# Patient Record
Sex: MALE | Race: WHITE | Employment: FULL TIME | RURAL
[De-identification: names, ages, dates, MRNs, and addresses within clinical notes are randomized per-mention and may not be internally consistent; named-entity substitution may affect disease eponyms.]

---

## 2017-01-04 ENCOUNTER — OFFICE VISIT (OUTPATIENT)
Dept: PRIMARY CARE CLINIC | Age: 22
End: 2017-01-04
Payer: COMMERCIAL

## 2017-01-04 VITALS
WEIGHT: 197.4 LBS | BODY MASS INDEX: 26.16 KG/M2 | HEART RATE: 74 BPM | OXYGEN SATURATION: 97 % | RESPIRATION RATE: 20 BRPM | DIASTOLIC BLOOD PRESSURE: 72 MMHG | SYSTOLIC BLOOD PRESSURE: 110 MMHG | HEIGHT: 73 IN

## 2017-01-04 DIAGNOSIS — K29.00 ACUTE GASTRITIS WITHOUT HEMORRHAGE, UNSPECIFIED GASTRITIS TYPE: Primary | ICD-10-CM

## 2017-01-04 DIAGNOSIS — F41.9 ANXIETY: ICD-10-CM

## 2017-01-04 PROCEDURE — 99214 OFFICE O/P EST MOD 30 MIN: CPT | Performed by: NURSE PRACTITIONER

## 2017-01-04 ASSESSMENT — PATIENT HEALTH QUESTIONNAIRE - PHQ9
1. LITTLE INTEREST OR PLEASURE IN DOING THINGS: 1
SUM OF ALL RESPONSES TO PHQ9 QUESTIONS 1 & 2: 1
SUM OF ALL RESPONSES TO PHQ QUESTIONS 1-9: 1
2. FEELING DOWN, DEPRESSED OR HOPELESS: 0

## 2017-01-04 ASSESSMENT — ENCOUNTER SYMPTOMS
EYES NEGATIVE: 1
RESPIRATORY NEGATIVE: 1
GASTROINTESTINAL NEGATIVE: 1

## 2017-01-24 PROBLEM — K29.00 ACUTE GASTRITIS WITHOUT HEMORRHAGE: Status: ACTIVE | Noted: 2017-01-24

## 2017-05-05 ENCOUNTER — OFFICE VISIT (OUTPATIENT)
Dept: PRIMARY CARE CLINIC | Age: 22
End: 2017-05-05
Payer: COMMERCIAL

## 2017-05-05 VITALS
WEIGHT: 198 LBS | BODY MASS INDEX: 25.41 KG/M2 | DIASTOLIC BLOOD PRESSURE: 72 MMHG | OXYGEN SATURATION: 99 % | SYSTOLIC BLOOD PRESSURE: 124 MMHG | RESPIRATION RATE: 16 BRPM | TEMPERATURE: 98.1 F | HEIGHT: 74 IN | HEART RATE: 84 BPM

## 2017-05-05 DIAGNOSIS — M26.621 ARTHRALGIA OF RIGHT TEMPOROMANDIBULAR JOINT: Primary | ICD-10-CM

## 2017-05-05 PROCEDURE — 99212 OFFICE O/P EST SF 10 MIN: CPT | Performed by: NURSE PRACTITIONER

## 2017-05-05 RX ORDER — M-VIT,TX,IRON,MINS/CALC/FOLIC 27MG-0.4MG
1 TABLET ORAL DAILY
COMMUNITY

## 2017-05-05 ASSESSMENT — ENCOUNTER SYMPTOMS
EYES NEGATIVE: 1
RESPIRATORY NEGATIVE: 1
FACIAL SWELLING: 0
SINUS PRESSURE: 0
SORE THROAT: 0

## 2017-07-13 ENCOUNTER — OFFICE VISIT (OUTPATIENT)
Dept: PRIMARY CARE CLINIC | Age: 22
End: 2017-07-13
Payer: COMMERCIAL

## 2017-07-13 VITALS
SYSTOLIC BLOOD PRESSURE: 126 MMHG | BODY MASS INDEX: 25.36 KG/M2 | HEART RATE: 76 BPM | RESPIRATION RATE: 20 BRPM | HEIGHT: 74 IN | OXYGEN SATURATION: 97 % | WEIGHT: 197.6 LBS | DIASTOLIC BLOOD PRESSURE: 78 MMHG

## 2017-07-13 DIAGNOSIS — K62.5 RECTAL BLEEDING: Primary | ICD-10-CM

## 2017-07-13 PROCEDURE — 99213 OFFICE O/P EST LOW 20 MIN: CPT | Performed by: NURSE PRACTITIONER

## 2017-07-13 ASSESSMENT — ENCOUNTER SYMPTOMS
EYES NEGATIVE: 1
DIARRHEA: 0
ANAL BLEEDING: 1
ABDOMINAL DISTENTION: 0
NAUSEA: 0
ABDOMINAL PAIN: 0
RESPIRATORY NEGATIVE: 1
VOMITING: 0
RECTAL PAIN: 0

## 2017-07-20 ENCOUNTER — OFFICE VISIT (OUTPATIENT)
Dept: SURGERY | Facility: CLINIC | Age: 22
End: 2017-07-20

## 2017-07-20 VITALS
RESPIRATION RATE: 16 BRPM | HEART RATE: 62 BPM | SYSTOLIC BLOOD PRESSURE: 132 MMHG | DIASTOLIC BLOOD PRESSURE: 69 MMHG | BODY MASS INDEX: 26 KG/M2 | TEMPERATURE: 98.9 F | HEIGHT: 73 IN | WEIGHT: 196.2 LBS

## 2017-07-20 DIAGNOSIS — K62.5 RECTAL BLEED: Primary | ICD-10-CM

## 2017-07-20 PROCEDURE — 99203 OFFICE O/P NEW LOW 30 MIN: CPT | Performed by: SURGERY

## 2017-07-20 RX ORDER — MULTIPLE VITAMINS W/ MINERALS TAB 9MG-400MCG
1 TAB ORAL DAILY
COMMUNITY

## 2017-07-20 NOTE — PROGRESS NOTES
"Patient: Paramjit Eng    YOB: 1995    Date: 07/20/2017    Primary Care Provider: HEATHER Ramos    Reason for Consultation: rectal bleeding    Chief complaint:   Chief Complaint   Patient presents with   • Rectal Bleeding       Subjective .     History of present illness:  Patient presents today with rectal bleeding that started last week that happened a few times with small amounts of small bright red blood on the tissue after having a bowel movement. He denies any constipation ad and does not have a family history of colon cancer.     Review of Systems   Constitutional: Negative for chills, fever and unexpected weight change.   HENT: Negative for trouble swallowing and voice change.    Eyes: Negative for visual disturbance.   Respiratory: Negative for apnea, cough, chest tightness, shortness of breath and wheezing.    Cardiovascular: Negative for chest pain, palpitations and leg swelling.   Gastrointestinal: Positive for anal bleeding. Negative for abdominal distention, abdominal pain, blood in stool, constipation, diarrhea, nausea, rectal pain and vomiting.   Endocrine: Negative for cold intolerance and heat intolerance.   Genitourinary: Negative for difficulty urinating, dysuria, flank pain, scrotal swelling and testicular pain.   Musculoskeletal: Negative for back pain, gait problem and joint swelling.   Skin: Negative for color change, rash and wound.   Neurological: Negative for dizziness, syncope, speech difficulty, weakness, numbness and headaches.   Hematological: Negative for adenopathy. Does not bruise/bleed easily.   Psychiatric/Behavioral: Negative for confusion. The patient is not nervous/anxious.        Allergies:  No Known Allergies    Medications:    Current Outpatient Prescriptions:   •  Multiple Vitamins-Minerals (MULTIVITAMIN WITH MINERALS) tablet tablet, Take 1 tablet by mouth Daily., Disp: , Rfl:     History\"  History reviewed. No pertinent past medical " "history.    Past Surgical History:   Procedure Laterality Date   • SPLENECTOMY         History reviewed. No pertinent family history.    Social History   Substance Use Topics   • Smoking status: Never Smoker   • Smokeless tobacco: Never Used   • Alcohol use No        Objective     Vital Signs:   /69  Pulse 62  Temp 98.9 °F (37.2 °C) (Temporal Artery )   Resp 16  Ht 73\" (185.4 cm)  Wt 196 lb 3.2 oz (89 kg)  BMI 25.89 kg/m2    Physical Exam:   General Appearance:    Alert, cooperative, in no acute distress   Head:    Normocephalic, without obvious abnormality, atraumatic   Eyes:            Lids and lashes normal, conjunctivae and sclerae normal, no   icterus, no pallor, corneas clear, PERRLA   Ears:    Ears appear intact with no abnormalities noted   Throat:   No oral lesions, no thrush, oral mucosa moist   Neck:   No adenopathy, supple, trachea midline, no thyromegaly, no   carotid bruit, no JVD   Lungs:     Clear to auscultation,respirations regular, even and                  unlabored    Heart:    Regular rhythm and normal rate, normal S1 and S2, no            murmur, no gallop, no rub, no click   Chest Wall:    No abnormalities observed   Abdomen:     Normal bowel sounds, no masses, no organomegaly, soft        non-tender, non-distended, no guarding, no rebound                tenderness   Extremities:   Moves all extremities well, no edema, no cyanosis, no             redness   Pulses:   Pulses palpable and equal bilaterally   Skin:   No bleeding, bruising or rash   Lymph nodes:   No palpable adenopathy   Neurologic:   Cranial nerves 2 - 12 grossly intact, sensation intact, DTR       present and equal bilaterally  Rectal-no anal fissure, no internal hemorrhoids. No visible bleeding      Results Review:   I reviewed the patient's new clinical results.    Assessment/Plan     1. Rectal bleed        Likely related to internal hemorrhoids. No other symptoms to indicate a chronic problem. Due to age and " negative family history, would recommend close observation. We'll also discuss this with his mother to see if they want a colonoscopy.    I discussed the patients findings and my recommendations with patient    Electronically signed by Amy Aaron MD  07/20/17      .          Repair related initially I will 4 mm me I will

## 2017-09-15 ENCOUNTER — OFFICE VISIT (OUTPATIENT)
Dept: PRIMARY CARE CLINIC | Age: 22
End: 2017-09-15
Payer: COMMERCIAL

## 2017-09-15 VITALS
DIASTOLIC BLOOD PRESSURE: 70 MMHG | OXYGEN SATURATION: 98 % | HEART RATE: 60 BPM | RESPIRATION RATE: 20 BRPM | WEIGHT: 195.6 LBS | BODY MASS INDEX: 25.1 KG/M2 | TEMPERATURE: 98.9 F | HEIGHT: 74 IN | SYSTOLIC BLOOD PRESSURE: 120 MMHG

## 2017-09-15 DIAGNOSIS — J01.00 ACUTE NON-RECURRENT MAXILLARY SINUSITIS: Primary | ICD-10-CM

## 2017-09-15 PROCEDURE — 99213 OFFICE O/P EST LOW 20 MIN: CPT | Performed by: NURSE PRACTITIONER

## 2017-09-15 RX ORDER — AMOXICILLIN 875 MG/1
875 TABLET, COATED ORAL 2 TIMES DAILY
Qty: 20 TABLET | Refills: 0 | Status: SHIPPED | OUTPATIENT
Start: 2017-09-15 | End: 2017-09-25

## 2017-09-15 ASSESSMENT — ENCOUNTER SYMPTOMS
RESPIRATORY NEGATIVE: 1
GASTROINTESTINAL NEGATIVE: 1
RHINORRHEA: 1
EYES NEGATIVE: 1

## 2017-09-17 ENCOUNTER — APPOINTMENT (OUTPATIENT)
Dept: CT IMAGING | Facility: HOSPITAL | Age: 22
End: 2017-09-17

## 2017-09-17 ENCOUNTER — HOSPITAL ENCOUNTER (EMERGENCY)
Facility: HOSPITAL | Age: 22
Discharge: HOME | End: 2017-09-17
Attending: EMERGENCY MEDICINE | Admitting: EMERGENCY MEDICINE

## 2017-09-17 VITALS
HEIGHT: 73 IN | TEMPERATURE: 97.3 F | RESPIRATION RATE: 18 BRPM | OXYGEN SATURATION: 97 % | BODY MASS INDEX: 25.18 KG/M2 | HEART RATE: 80 BPM | DIASTOLIC BLOOD PRESSURE: 68 MMHG | WEIGHT: 190 LBS | SYSTOLIC BLOOD PRESSURE: 120 MMHG

## 2017-09-17 DIAGNOSIS — R20.2 PARESTHESIA: Primary | ICD-10-CM

## 2017-09-17 LAB
ALBUMIN SERPL-MCNC: 4.8 G/DL (ref 3.5–5)
ALBUMIN/GLOB SERPL: 1.5 G/DL (ref 1–2)
ALP SERPL-CCNC: 44 U/L (ref 38–126)
ALT SERPL W P-5'-P-CCNC: 35 U/L (ref 13–69)
ANION GAP SERPL CALCULATED.3IONS-SCNC: 15.6 MMOL/L
AST SERPL-CCNC: 21 U/L (ref 15–46)
BASOPHILS # BLD AUTO: 0.07 10*3/MM3 (ref 0–0.2)
BASOPHILS NFR BLD AUTO: 0.9 % (ref 0–2.5)
BILIRUB SERPL-MCNC: 0.5 MG/DL (ref 0.2–1.3)
BUN BLD-MCNC: 21 MG/DL (ref 7–20)
BUN/CREAT SERPL: 26.3 (ref 6.3–21.9)
CALCIUM SPEC-SCNC: 9.6 MG/DL (ref 8.4–10.2)
CHLORIDE SERPL-SCNC: 104 MMOL/L (ref 98–107)
CO2 SERPL-SCNC: 25 MMOL/L (ref 26–30)
CREAT BLD-MCNC: 0.8 MG/DL (ref 0.6–1.3)
DEPRECATED RDW RBC AUTO: 44.1 FL (ref 37–54)
EOSINOPHIL # BLD AUTO: 0.18 10*3/MM3 (ref 0–0.7)
EOSINOPHIL NFR BLD AUTO: 2.4 % (ref 0–7)
ERYTHROCYTE [DISTWIDTH] IN BLOOD BY AUTOMATED COUNT: 12.9 % (ref 11.5–14.5)
GFR SERPL CREATININE-BSD FRML MDRD: 123 ML/MIN/1.73
GLOBULIN UR ELPH-MCNC: 3.1 GM/DL
GLUCOSE BLD-MCNC: 113 MG/DL (ref 74–98)
GLUCOSE BLDC GLUCOMTR-MCNC: 115 MG/DL (ref 70–130)
HCT VFR BLD AUTO: 43.9 % (ref 42–52)
HGB BLD-MCNC: 14.5 G/DL (ref 14–18)
HOLD SPECIMEN: NORMAL
HOLD SPECIMEN: NORMAL
IMM GRANULOCYTES # BLD: 0.01 10*3/MM3 (ref 0–0.06)
IMM GRANULOCYTES NFR BLD: 0.1 % (ref 0–0.6)
LYMPHOCYTES # BLD AUTO: 2.64 10*3/MM3 (ref 0.6–3.4)
LYMPHOCYTES NFR BLD AUTO: 35.3 % (ref 10–50)
MCH RBC QN AUTO: 30.8 PG (ref 27–31)
MCHC RBC AUTO-ENTMCNC: 33 G/DL (ref 30–37)
MCV RBC AUTO: 93.2 FL (ref 80–94)
MONOCYTES # BLD AUTO: 0.95 10*3/MM3 (ref 0–0.9)
MONOCYTES NFR BLD AUTO: 12.7 % (ref 0–12)
NEUTROPHILS # BLD AUTO: 3.63 10*3/MM3 (ref 2–6.9)
NEUTROPHILS NFR BLD AUTO: 48.6 % (ref 37–80)
NRBC BLD MANUAL-RTO: 0 /100 WBC (ref 0–0)
PLATELET # BLD AUTO: 269 10*3/MM3 (ref 130–400)
PMV BLD AUTO: 11.6 FL (ref 6–12)
POTASSIUM BLD-SCNC: 3.6 MMOL/L (ref 3.5–5.1)
PROT SERPL-MCNC: 7.9 G/DL (ref 6.3–8.2)
RBC # BLD AUTO: 4.71 10*6/MM3 (ref 4.7–6.1)
SODIUM BLD-SCNC: 141 MMOL/L (ref 137–145)
TROPONIN I SERPL-MCNC: <0.012 NG/ML (ref 0–0.03)
WBC NRBC COR # BLD: 7.48 10*3/MM3 (ref 4.8–10.8)
WHOLE BLOOD HOLD SPECIMEN: NORMAL
WHOLE BLOOD HOLD SPECIMEN: NORMAL

## 2017-09-17 PROCEDURE — 93005 ELECTROCARDIOGRAM TRACING: CPT | Performed by: EMERGENCY MEDICINE

## 2017-09-17 PROCEDURE — 85025 COMPLETE CBC W/AUTO DIFF WBC: CPT | Performed by: PHYSICIAN ASSISTANT

## 2017-09-17 PROCEDURE — 80053 COMPREHEN METABOLIC PANEL: CPT | Performed by: PHYSICIAN ASSISTANT

## 2017-09-17 PROCEDURE — 82962 GLUCOSE BLOOD TEST: CPT

## 2017-09-17 PROCEDURE — 84484 ASSAY OF TROPONIN QUANT: CPT | Performed by: PHYSICIAN ASSISTANT

## 2017-09-17 PROCEDURE — 99284 EMERGENCY DEPT VISIT MOD MDM: CPT

## 2017-09-17 PROCEDURE — 70450 CT HEAD/BRAIN W/O DYE: CPT

## 2017-09-17 RX ORDER — SODIUM CHLORIDE 0.9 % (FLUSH) 0.9 %
10 SYRINGE (ML) INJECTION AS NEEDED
Status: DISCONTINUED | OUTPATIENT
Start: 2017-09-17 | End: 2017-09-17 | Stop reason: HOSPADM

## 2017-09-17 NOTE — ED PROVIDER NOTES
Subjective   HPI Comments: Old male presents to emergency department with a numbness sensation on the left side of his face and left arm.  He also had left upper chest pain that lasted briefly.  He's had the symptoms intermittently for 2 days.  He states that they became worse while at work today.  He states that his feels like it's tingling but he can feel sensation to touch.  No loss of vision no trouble speaking no loss of movement on either side of his body.  He also states that he had a sharp chest pain that lasted briefly.      History provided by:  Patient   used: No        Review of Systems   Neurological:        Numbness   All other systems reviewed and are negative.      History reviewed. No pertinent past medical history.    No Known Allergies    Past Surgical History:   Procedure Laterality Date   • SPLENECTOMY         History reviewed. No pertinent family history.    Social History     Social History   • Marital status: Single     Spouse name: N/A   • Number of children: N/A   • Years of education: N/A     Social History Main Topics   • Smoking status: Never Smoker   • Smokeless tobacco: Never Used   • Alcohol use No   • Drug use: No   • Sexual activity: Not Asked     Other Topics Concern   • None     Social History Narrative           Objective   Physical Exam   Constitutional: He is oriented to person, place, and time. He appears well-developed and well-nourished.   HENT:   Head: Normocephalic and atraumatic.   Left Ear: External ear normal.   Eyes: EOM are normal. Pupils are equal, round, and reactive to light.   Neck: Normal range of motion.   Cardiovascular: Normal rate and regular rhythm.    Pulmonary/Chest: Effort normal and breath sounds normal.   Abdominal: Soft. Bowel sounds are normal.   Musculoskeletal: Normal range of motion.   Neurological: He is alert and oriented to person, place, and time.   Skin: Skin is warm and dry.   Psychiatric: He has a normal mood and affect.  His behavior is normal. Judgment and thought content normal.   Nursing note and vitals reviewed.      Procedures         ED Course  ED Course                  MDM    Final diagnoses:   Paresthesia            Obinna High Jr., PA-C  09/17/17 1935

## 2017-09-18 ENCOUNTER — TELEPHONE (OUTPATIENT)
Dept: PRIMARY CARE CLINIC | Age: 22
End: 2017-09-18

## 2019-12-10 RX ORDER — ONDANSETRON 4 MG/1
4 TABLET, ORALLY DISINTEGRATING ORAL EVERY 6 HOURS PRN
Qty: 40 TABLET | Refills: 0 | OUTPATIENT
Start: 2019-12-10 | End: 2020-11-16

## 2020-11-16 PROCEDURE — U0004 COV-19 TEST NON-CDC HGH THRU: HCPCS | Performed by: NURSE PRACTITIONER

## 2020-12-04 ENCOUNTER — HOSPITAL ENCOUNTER (EMERGENCY)
Facility: HOSPITAL | Age: 25
Discharge: HOME | End: 2020-12-04
Attending: HOSPITALIST
Payer: COMMERCIAL

## 2020-12-04 VITALS
RESPIRATION RATE: 18 BRPM | SYSTOLIC BLOOD PRESSURE: 131 MMHG | BODY MASS INDEX: 27.59 KG/M2 | OXYGEN SATURATION: 97 % | HEIGHT: 74 IN | TEMPERATURE: 98.2 F | DIASTOLIC BLOOD PRESSURE: 76 MMHG | WEIGHT: 215 LBS | HEART RATE: 87 BPM

## 2020-12-04 PROCEDURE — 99283 EMERGENCY DEPT VISIT LOW MDM: CPT

## 2020-12-04 ASSESSMENT — PAIN DESCRIPTION - DESCRIPTORS: DESCRIPTORS: ACHING;SHARP

## 2020-12-04 ASSESSMENT — PAIN DESCRIPTION - ORIENTATION: ORIENTATION: LEFT

## 2020-12-04 ASSESSMENT — PAIN SCALES - GENERAL: PAINLEVEL_OUTOF10: 5

## 2020-12-04 ASSESSMENT — PAIN DESCRIPTION - LOCATION: LOCATION: LEG

## 2020-12-04 ASSESSMENT — PAIN DESCRIPTION - FREQUENCY: FREQUENCY: CONTINUOUS

## 2020-12-04 ASSESSMENT — PAIN DESCRIPTION - PAIN TYPE: TYPE: ACUTE PAIN

## 2020-12-04 NOTE — ED PROVIDER NOTES
[address] [facility] ENCOUNTER      Pt Name: Yvan Rasmussen  MRN: [id number]  YOB: 1995  Date of evaluation: 12/4/2020  Provider: Lisa Navarro, [address] [place] [address]       Chief Complaint   Patient presents with    Leg Pain         HISTORY OF PRESENT ILLNESS  (Location/Symptom, Timing/Onset, Context/Setting, Quality, Duration, Modifying Factors, Severity.)   Yvan Rasmussen is a 26 y.o. male who presents to the emergency department for left leg injury. 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 Patient states he was at a customer's house and states that the individual just put up a new set of stairs and he had to put his weight down on the first step and the entire thing collapsed. Patient states he did have a fall but he sort of caught himself and scraped his left leg against some of the wood. Patient denies any other pain or injury. Patient states he would need to be here for evaluation but his workplace wanted to come to have it evaluated make sure there was not anything broken or needed any further evaluation. Patient states he is been able to ambulate without any difficulty since the time. His only complaint is some pain and swelling to the shin of his left lower leg. Denies any numbness tingling or weakness out of ordinary. Denies any chest pain or shortness of breath. Denies any difficulty with ambulating. Denies any pain in any other extremity. Denies any head injury or loss of consciousness secondary to the incident. Nursing notes were reviewed.     REVIEW OFSYSTEMS    (2-9 systems for level 4, 10 or more for level 5)   ROS:  General:  No fevers, no chills, no weakness  Cardiovascular:  No chest pain, no palpitations  Respiratory:  No shortness of breath, no cough, no wheezing  Gastrointestinal:  No pain, no nausea, no vomiting, no diarrhea  Musculoskeletal:  +left shin pain/abrasion/swelling, no joint pain  Skin:  No rash, no partner: None     Emotionally abused: None     Physically abused: None     Forced sexual activity: None   Other Topics Concern    None   Social History Narrative    None         PHYSICAL EXAM    (up to 7 for level 4, 8 or more for level 5)     ED Triage Vitals [12/04/20 1033]   BP Temp Temp Source Pulse Resp SpO2 Height Weight   -- 98.2 °F (36.8 °C) Oral -- -- -- 6' 2\" (1.88 m) 215 lb (97.5 kg)       Physical Exam  General :Patient is awake, alert, oriented, in no acute distress, nontoxic appearing  HEENT: Pupils are equally round and reactive to light, EOMI, conjunctivae clear. Oral mucosa is moist, no exudate. Uvula is midline  Cardiac: Heart regular rate, rhythm, no murmurs, rubs, or gallops  Lungs: Lungs are clear to auscultation, there is no wheezing, rhonchi, or rales. There is no use of accessory muscles. Abdomen: Abdomen is soft, nontender, nondistended. There is no firm or pulsatile masses, no rebound rigidity or guarding. Musculoskeletal: 5 out of 5 strength in all 4 extremities. No focal muscle deficits are appreciated, patient has an abrasion noted to the left mid shin which measures in his longest diameter 6 cm in length and 3 cm in width however the entire portion of that area is not that long or wide over the majority. There is some underlying swelling and a formation of a hematoma to the lower aspect of that wound. Patient tolerates palpation of the area without any difficulty. There is no active bleeding noted. Patient is able to bear weight on the extremity for any difficulty he is able to flex and extend the foot without any difficulty and also against resistance. Neuro: Motor intact, sensory intact, level of consciousness is normal  Dermatology: Skin is warm and dry  Psych: Mentation is grossly normal, cognition is grossly normal. Affect is appropriate.       DIAGNOSTIC RESULTS     EKG: All EKG's are interpreted by the Emergency Department Physician who either signs or Co-signs this chart in the [address] a cardiologist.        RADIOLOGY:   Non-plain film images such as CT, Ultrasound and MRI are read by the radiologist. Plain radiographic images are visualized and preliminarily interpreted by the emergency physician with the below findings:      ? Radiologist's Report Reviewed:  No orders to display         ED BEDSIDE ULTRASOUND:   Performed by ED Physician - none    LABS:    I have reviewed and interpreted all of the currently available lab results from this visit (ifapplicable):  No results found for this visit on 12/04/20. All other labs were within normal range or not returned as of this dictation. EMERGENCY DEPARTMENT COURSE and DIFFERENTIAL DIAGNOSIS/MDM:   Vitals:    Vitals:    12/04/20 1033 12/04/20 1039   BP:  (!) 122/91   Pulse:  94   Resp:  18   Temp: 98.2 °F (36.8 °C)    TempSrc: Oral    SpO2:  98%   Weight: 215 lb (97.5 kg)    Height: 6' 2\" (1.88 m)        MEDICATIONS ADMINISTERED IN ED:  Medications - No data to display    After initial evaluation and examination I did have a conversation with the patient about the upcoming plan, treatment possible disposition which they were agreeable to the times dictation. Patient advised that he could use an ice pack over the next 24 hours to help with pain and swelling. Advised use 20 minutes on 20 minutes off and not to put the ice directly against the skin to make sure there is Esaw Magallanes some type of thin barrier. He does state his understanding of this. Patient's been able to ambulate on the extremity without any difficulty the area and distribution of the abrasion and bruising really is not consistent with the need of a radiograph to rule out fracture. He has no tenderness at the tibial plateau area and again he is able to bear weight without any difficulty.   Patient advised that this abrasion will have a hematoma under it which will turn many different colors black to green to yellow in prior take several weeks if not a month or longer to go completely away and he does state his understanding of this advised that the ice pack can help with some of the swelling. Patient was given instructions that we will bandage and dressed the area after cleaning the wound here to leave the dressing on for the first 24 hours then after that he can leave it open to air afterwards. He does not need to use any antibiotic ointment or a peroxide to the area just clean it with soap and water and should heal without any difficulty. However he was advised that if he gets to an area and he is afraid that the abrasion may become contaminated he could cover or use a dressing at that time. Patient be discharged home in stable condition advised that he could return back to work and is agreeable. Patient be discharged home in stable condition. The patient advised that he does need to follow-up with his regular family physician within the next 1 to 2 days reevaluation. Patient was also given instructions if symptoms worsens or new symptoms arise he should return back to emergency department for further evaluation work-up. Advised use over-the-counter Tylenol or Motrin for pain. CONSULTS:  None    PROCEDURES:  Procedures    CRITICAL CARE TIME    Total Critical Care time was 0 minutes, excluding separately reportable procedures. There was a high probability of clinically significant/life threatening deterioration in the patient's condition which required my urgent intervention. FINAL IMPRESSION      1. Abrasion of left lower leg, initial encounter    2. Traumatic hematoma of left lower leg, initial encounter          DISPOSITION/PLAN   DISPOSITION        PATIENT REFERRED TO:  Jamal Degroot, APRN  [id number]  [phone]    In 2 days      [facility]  [facility] [address]   [facility]  [phone]    As needed, If symptoms worsen      DISCHARGE MEDICATIONS:  New Prescriptions    No medications on file       Comment: Please note this report has been produced using speech recognition software and may contain errorsrelated to that system including errors in grammar, punctuation, and spelling, as well as words and phrases that may be inappropriate. If there are any questions or concerns please feel free to contact the dictating providerfor clarification.     Sindy Wood DO  Attending Emergency Physician              Sindy Wood DO  12/04/20 1125

## 2020-12-04 NOTE — ED NOTES
RN went over d/c instructions with pt. Advised can follow up with PCP in a couple days. May return to ER if condition changes or worsens. No further questions or concerns. Pt ambulated out with no issues.       Bianka Singh RN  12/04/20 9690

## 2020-12-04 NOTE — ED TRIAGE NOTES
Pt came to er today with leg pain from falling down a stairwell that gave out at work about 30 minutes ago. Pt rates pain 5/10. States pain is aching and stabbing and stinging. 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

## 2021-08-16 ENCOUNTER — OFFICE VISIT (OUTPATIENT)
Dept: SURGERY | Facility: CLINIC | Age: 26
End: 2021-08-16

## 2021-08-16 VITALS
TEMPERATURE: 97.1 F | WEIGHT: 241.6 LBS | HEIGHT: 74 IN | HEART RATE: 85 BPM | BODY MASS INDEX: 31.01 KG/M2 | OXYGEN SATURATION: 100 % | DIASTOLIC BLOOD PRESSURE: 84 MMHG | SYSTOLIC BLOOD PRESSURE: 126 MMHG

## 2021-08-16 DIAGNOSIS — L30.9 DERMATITIS: Primary | ICD-10-CM

## 2021-08-16 PROCEDURE — 99203 OFFICE O/P NEW LOW 30 MIN: CPT | Performed by: SURGERY

## 2021-08-16 NOTE — PROGRESS NOTES
"Patient: Paramjit Eng    YOB: 1995    Date: 08/16/2021    Primary Care Provider: Le Olson APRN    Chief Complaint   Patient presents with   • Skin Lesion     left side of neck       SUBJECTIVE:    History of present illness:  I saw the patient in the office today for evaluation and consultation of questionable lesion on neck. Patient states he noticed a spot on on left side of neck 3 days ago that is red and is warm to the touch.  Area is tender and sore.  No fluctuance or drainage.  No known bite or exposure to poison ivy.    The following portions of the patient's history were reviewed and updated as appropriate: allergies, current medications, past family history, past medical history, past social history, past surgical history and problem list.      Review of Systems    Allergies:  No Known Allergies    Medications:    Current Outpatient Medications:   •  Multiple Vitamins-Minerals (MULTIVITAMIN WITH MINERALS) tablet tablet, Take 1 tablet by mouth Daily., Disp: , Rfl:   •  methylPREDNISolone (MEDROL) 4 MG dose pack, Take as directed on package instructions., Disp: 1 each, Rfl: 0    History:  History reviewed. No pertinent past medical history.    Past Surgical History:   Procedure Laterality Date   • SPLENECTOMY         History reviewed. No pertinent family history.    Social History     Tobacco Use   • Smoking status: Never Smoker   • Smokeless tobacco: Never Used   Substance Use Topics   • Alcohol use: No   • Drug use: No        OBJECTIVE:    Vital Signs:   Vitals:    08/16/21 1520   BP: 126/84   Pulse: 85   Temp: 97.1 °F (36.2 °C)   SpO2: 100%   Weight: 110 kg (241 lb 9.6 oz)   Height: 188 cm (74\")       Physical Exam:   General Appearance:    Alert, cooperative, in no acute distress   Head:    Normocephalic, without obvious abnormality, atraumatic   Eyes:            Lids and lashes normal, conjunctivae and sclerae normal, no   icterus, no pallor, corneas clear, PERRLA   Ears:    " Ears appear intact with no abnormalities noted   Throat:   No oral lesions, no thrush, oral mucosa moist   Neck:   No adenopathy, supple, trachea midline, no thyromegaly, no   carotid bruit, no JVD   Lungs:     Clear to auscultation,respirations regular, even and                  unlabored    Heart:    Regular rhythm and normal rate, normal S1 and S2, no            murmur, no gallop, no rub, no click   Chest Wall:    No abnormalities observed   Abdomen:     Normal bowel sounds, no masses, no organomegaly, soft        non-tender, non-distended, no guarding, no rebound                tenderness   Extremities:   Moves all extremities well, no edema, no cyanosis, no             redness   Pulses:   Pulses palpable and equal bilaterally   Skin:   No bleeding, bruising but patient has an area measures 2 x 3 cm on the left neck, raised areas with dermatitis.  No fluctuance or cellulitis.   Lymph nodes:   No palpable adenopathy   Neurologic:   Cranial nerves 2 - 12 grossly intact, sensation intact, DTR       present and equal bilaterally     Results Review:   I reviewed the patient's new clinical results.    Review of Systems was reviewed and confirmed as accurate as documented by the MA.    ASSESSMENT/PLAN:    1. Dermatitis        Patient reassured, placed on hydrocortisone cream twice a day and follow-up in 7 days if no improvement.  No evidence of abscess or cellulitis at this time.    I discussed the patients findings and my recommendations with patient        Electronically signed by Amy Aaron MD  08/16/21

## 2022-05-11 ENCOUNTER — HOSPITAL ENCOUNTER (EMERGENCY)
Facility: HOSPITAL | Age: 27
Discharge: HOME | End: 2022-05-11
Attending: EMERGENCY MEDICINE | Admitting: EMERGENCY MEDICINE

## 2022-05-11 ENCOUNTER — APPOINTMENT (OUTPATIENT)
Dept: GENERAL RADIOLOGY | Facility: HOSPITAL | Age: 27
End: 2022-05-11

## 2022-05-11 VITALS
DIASTOLIC BLOOD PRESSURE: 88 MMHG | SYSTOLIC BLOOD PRESSURE: 143 MMHG | OXYGEN SATURATION: 100 % | BODY MASS INDEX: 31.18 KG/M2 | HEIGHT: 74 IN | HEART RATE: 69 BPM | TEMPERATURE: 97.9 F | WEIGHT: 243 LBS | RESPIRATION RATE: 20 BRPM

## 2022-05-11 DIAGNOSIS — R42 DIZZINESS OF UNKNOWN CAUSE: Primary | ICD-10-CM

## 2022-05-11 LAB
ALBUMIN SERPL-MCNC: 4.4 G/DL (ref 3.5–5.2)
ALBUMIN/GLOB SERPL: 1.6 G/DL
ALP SERPL-CCNC: 49 U/L (ref 39–117)
ALT SERPL W P-5'-P-CCNC: 13 U/L (ref 1–41)
ANION GAP SERPL CALCULATED.3IONS-SCNC: 10 MMOL/L (ref 5–15)
AST SERPL-CCNC: 14 U/L (ref 1–40)
B PARAPERT DNA SPEC QL NAA+PROBE: NOT DETECTED
B PERT DNA SPEC QL NAA+PROBE: NOT DETECTED
BASOPHILS # BLD AUTO: 0.06 10*3/MM3 (ref 0–0.2)
BASOPHILS NFR BLD AUTO: 1.1 % (ref 0–1.5)
BILIRUB SERPL-MCNC: 0.2 MG/DL (ref 0–1.2)
BILIRUB UR QL STRIP: NEGATIVE
BUN SERPL-MCNC: 17 MG/DL (ref 6–20)
BUN/CREAT SERPL: 20 (ref 7–25)
C PNEUM DNA NPH QL NAA+NON-PROBE: NOT DETECTED
CALCIUM SPEC-SCNC: 9.4 MG/DL (ref 8.6–10.5)
CHLORIDE SERPL-SCNC: 103 MMOL/L (ref 98–107)
CLARITY UR: CLEAR
CO2 SERPL-SCNC: 26 MMOL/L (ref 22–29)
COLOR UR: YELLOW
CREAT SERPL-MCNC: 0.85 MG/DL (ref 0.76–1.27)
DEPRECATED RDW RBC AUTO: 43.8 FL (ref 37–54)
EGFRCR SERPLBLD CKD-EPI 2021: 123.7 ML/MIN/1.73
EOSINOPHIL # BLD AUTO: 0.1 10*3/MM3 (ref 0–0.4)
EOSINOPHIL NFR BLD AUTO: 1.8 % (ref 0.3–6.2)
ERYTHROCYTE [DISTWIDTH] IN BLOOD BY AUTOMATED COUNT: 12.9 % (ref 12.3–15.4)
FLUAV SUBTYP SPEC NAA+PROBE: NOT DETECTED
FLUBV RNA ISLT QL NAA+PROBE: NOT DETECTED
GLOBULIN UR ELPH-MCNC: 2.7 GM/DL
GLUCOSE SERPL-MCNC: 115 MG/DL (ref 65–99)
GLUCOSE UR STRIP-MCNC: NEGATIVE MG/DL
HADV DNA SPEC NAA+PROBE: NOT DETECTED
HCOV 229E RNA SPEC QL NAA+PROBE: NOT DETECTED
HCOV HKU1 RNA SPEC QL NAA+PROBE: NOT DETECTED
HCOV NL63 RNA SPEC QL NAA+PROBE: NOT DETECTED
HCOV OC43 RNA SPEC QL NAA+PROBE: NOT DETECTED
HCT VFR BLD AUTO: 43 % (ref 37.5–51)
HGB BLD-MCNC: 14.2 G/DL (ref 13–17.7)
HGB UR QL STRIP.AUTO: NEGATIVE
HMPV RNA NPH QL NAA+NON-PROBE: NOT DETECTED
HPIV1 RNA ISLT QL NAA+PROBE: NOT DETECTED
HPIV2 RNA SPEC QL NAA+PROBE: NOT DETECTED
HPIV3 RNA NPH QL NAA+PROBE: NOT DETECTED
HPIV4 P GENE NPH QL NAA+PROBE: NOT DETECTED
IMM GRANULOCYTES # BLD AUTO: 0.01 10*3/MM3 (ref 0–0.05)
IMM GRANULOCYTES NFR BLD AUTO: 0.2 % (ref 0–0.5)
KETONES UR QL STRIP: NEGATIVE
LEUKOCYTE ESTERASE UR QL STRIP.AUTO: NEGATIVE
LIPASE SERPL-CCNC: 31 U/L (ref 13–60)
LYMPHOCYTES # BLD AUTO: 2.29 10*3/MM3 (ref 0.7–3.1)
LYMPHOCYTES NFR BLD AUTO: 41.3 % (ref 19.6–45.3)
M PNEUMO IGG SER IA-ACNC: NOT DETECTED
MAGNESIUM SERPL-MCNC: 2 MG/DL (ref 1.6–2.6)
MCH RBC QN AUTO: 30.6 PG (ref 26.6–33)
MCHC RBC AUTO-ENTMCNC: 33 G/DL (ref 31.5–35.7)
MCV RBC AUTO: 92.7 FL (ref 79–97)
MONOCYTES # BLD AUTO: 0.83 10*3/MM3 (ref 0.1–0.9)
MONOCYTES NFR BLD AUTO: 15 % (ref 5–12)
NEUTROPHILS NFR BLD AUTO: 2.26 10*3/MM3 (ref 1.7–7)
NEUTROPHILS NFR BLD AUTO: 40.6 % (ref 42.7–76)
NITRITE UR QL STRIP: NEGATIVE
NRBC BLD AUTO-RTO: 0 /100 WBC (ref 0–0.2)
PH UR STRIP.AUTO: 7 [PH] (ref 5–8)
PLATELET # BLD AUTO: 304 10*3/MM3 (ref 140–450)
PMV BLD AUTO: 10.8 FL (ref 6–12)
POTASSIUM SERPL-SCNC: 4 MMOL/L (ref 3.5–5.2)
PROT SERPL-MCNC: 7.1 G/DL (ref 6–8.5)
PROT UR QL STRIP: NEGATIVE
RBC # BLD AUTO: 4.64 10*6/MM3 (ref 4.14–5.8)
RHINOVIRUS RNA SPEC NAA+PROBE: NOT DETECTED
RSV RNA NPH QL NAA+NON-PROBE: NOT DETECTED
SARS-COV-2 RNA NPH QL NAA+NON-PROBE: NOT DETECTED
SODIUM SERPL-SCNC: 139 MMOL/L (ref 136–145)
SP GR UR STRIP: 1.01 (ref 1–1.03)
TROPONIN T SERPL-MCNC: <0.01 NG/ML (ref 0–0.03)
UROBILINOGEN UR QL STRIP: NORMAL
WBC NRBC COR # BLD: 5.55 10*3/MM3 (ref 3.4–10.8)

## 2022-05-11 PROCEDURE — 93005 ELECTROCARDIOGRAM TRACING: CPT | Performed by: NURSE PRACTITIONER

## 2022-05-11 PROCEDURE — 83690 ASSAY OF LIPASE: CPT | Performed by: NURSE PRACTITIONER

## 2022-05-11 PROCEDURE — 85025 COMPLETE CBC W/AUTO DIFF WBC: CPT | Performed by: NURSE PRACTITIONER

## 2022-05-11 PROCEDURE — 80053 COMPREHEN METABOLIC PANEL: CPT | Performed by: NURSE PRACTITIONER

## 2022-05-11 PROCEDURE — 81003 URINALYSIS AUTO W/O SCOPE: CPT | Performed by: NURSE PRACTITIONER

## 2022-05-11 PROCEDURE — 0202U NFCT DS 22 TRGT SARS-COV-2: CPT | Performed by: NURSE PRACTITIONER

## 2022-05-11 PROCEDURE — 99283 EMERGENCY DEPT VISIT LOW MDM: CPT

## 2022-05-11 PROCEDURE — 84484 ASSAY OF TROPONIN QUANT: CPT | Performed by: NURSE PRACTITIONER

## 2022-05-11 PROCEDURE — 71045 X-RAY EXAM CHEST 1 VIEW: CPT

## 2022-05-11 PROCEDURE — 83735 ASSAY OF MAGNESIUM: CPT | Performed by: NURSE PRACTITIONER

## 2022-05-11 RX ORDER — SODIUM CHLORIDE 0.9 % (FLUSH) 0.9 %
10 SYRINGE (ML) INJECTION AS NEEDED
Status: DISCONTINUED | OUTPATIENT
Start: 2022-05-11 | End: 2022-05-11 | Stop reason: HOSPADM

## 2022-05-11 RX ADMIN — SODIUM CHLORIDE 1000 ML: 9 INJECTION, SOLUTION INTRAVENOUS at 11:16

## 2022-05-11 NOTE — ED PROVIDER NOTES
Subjective   26-year-old male presents to the ED today for complaint of feeling dizzy and feeling like he is foggy this morning.  He did eat and drink this morning.  Says that he has still felt bad.  No nausea or vomiting.  He does have a small headache above his right eye that is causing some dizziness.  He has not been sick recently with fever, chills, nausea or vomiting.  Mom is with him and states that he had an accident a long time ago when he was a teenager that he had a liver lac and had a cardiac contusion.  He has had some of these episodes since then.  He was told he would grow out of this problem.  He denies any chest pain or shortness of breath.  No other associated signs or symptoms today          Review of Systems   Constitutional: Positive for fatigue.   HENT: Negative.    Eyes: Negative.    Respiratory: Negative.    Cardiovascular: Negative.    Gastrointestinal: Negative.    Genitourinary: Negative.    Musculoskeletal: Negative.    Skin: Negative.    Allergic/Immunologic: Negative.    Neurological: Positive for dizziness and weakness.   Hematological: Negative.    Psychiatric/Behavioral: Negative.        History reviewed. No pertinent past medical history.    No Known Allergies    Past Surgical History:   Procedure Laterality Date   • SPLENECTOMY         History reviewed. No pertinent family history.    Social History     Socioeconomic History   • Marital status: Single   Tobacco Use   • Smoking status: Never Smoker   • Smokeless tobacco: Never Used   Substance and Sexual Activity   • Alcohol use: No   • Drug use: No   • Sexual activity: Defer           Objective   Physical Exam  Vitals and nursing note reviewed. Exam conducted with a chaperone present.   Constitutional:       Appearance: Normal appearance.   HENT:      Head: Normocephalic and atraumatic.      Right Ear: External ear normal.      Left Ear: External ear normal.      Nose: Nose normal.      Mouth/Throat:      Mouth: Mucous membranes  are moist.      Pharynx: Oropharynx is clear.   Eyes:      Extraocular Movements: Extraocular movements intact.      Conjunctiva/sclera: Conjunctivae normal.      Pupils: Pupils are equal, round, and reactive to light.   Cardiovascular:      Rate and Rhythm: Normal rate and regular rhythm.      Pulses: Normal pulses.      Heart sounds: Normal heart sounds.   Pulmonary:      Effort: Pulmonary effort is normal.      Breath sounds: Normal breath sounds.   Abdominal:      General: Bowel sounds are normal.      Palpations: Abdomen is soft.   Musculoskeletal:         General: Normal range of motion.      Cervical back: Normal range of motion.   Skin:     General: Skin is warm and dry.      Capillary Refill: Capillary refill takes less than 2 seconds.   Neurological:      Mental Status: He is alert and oriented to person, place, and time.   Psychiatric:         Behavior: Behavior normal.         Procedures           ED Course  ED Course as of 05/11/22 1241   Wed May 11, 2022   1136 EKG interpreted by me.  Sinus rhythm.  Rate of 83.  Sinus arrhythmia.  Nonspecific T wave changes.  Abnormal EKG [CG]   1239 Discussed results with patient and mother.  Everything was normal limits and unalarming.  Patient to follow-up with PCP [JK]      ED Course User Index  [CG] [place],   [JK] Amy Watson, HEATHER                                                 [facility]    Final diagnoses:   Dizziness of unknown cause       ED Disposition  ED Disposition     ED Disposition   Discharge    Condition   Stable    Comment   --             Le Olson APRN  [address] [facility] [address]  [phone]    Schedule an appointment as soon as possible for a visit   As needed, If symptoms worsen         Medication List      No changes were made to your prescriptions during this visit.          Amy Watson APRN  05/11/22 1241

## 2022-05-16 ENCOUNTER — OFFICE VISIT (OUTPATIENT)
Dept: PRIMARY CARE CLINIC | Age: 27
End: 2022-05-16
Payer: COMMERCIAL

## 2022-05-16 VITALS
WEIGHT: 244 LBS | HEART RATE: 85 BPM | HEIGHT: 74 IN | OXYGEN SATURATION: 99 % | BODY MASS INDEX: 31.32 KG/M2 | DIASTOLIC BLOOD PRESSURE: 86 MMHG | SYSTOLIC BLOOD PRESSURE: 124 MMHG

## 2022-05-16 DIAGNOSIS — R51.9 NONINTRACTABLE HEADACHE, UNSPECIFIED CHRONICITY PATTERN, UNSPECIFIED HEADACHE TYPE: ICD-10-CM

## 2022-05-16 DIAGNOSIS — Z09 HOSPITAL DISCHARGE FOLLOW-UP: Primary | ICD-10-CM

## 2022-05-16 DIAGNOSIS — R42 DIZZINESS: ICD-10-CM

## 2022-05-16 PROCEDURE — 99213 OFFICE O/P EST LOW 20 MIN: CPT | Performed by: PHYSICIAN ASSISTANT

## 2022-05-16 PROCEDURE — 1111F DSCHRG MED/CURRENT MED MERGE: CPT | Performed by: PHYSICIAN ASSISTANT

## 2022-05-16 RX ORDER — METHYLPREDNISOLONE 4 MG/1
TABLET ORAL
COMMUNITY
Start: 2020-11-16 | End: 2022-05-16

## 2022-05-16 ASSESSMENT — ENCOUNTER SYMPTOMS
GASTROINTESTINAL NEGATIVE: 1
RESPIRATORY NEGATIVE: 1

## 2022-05-16 NOTE — PROGRESS NOTES
Chief Complaint   Patient presents with    Follow-Up from Hospital    Dizziness     Pt here today for ED f/u dx Dizziness of unknown cause.  Still having episodes of dizziness, fatigue, \"fogginess\", lips tingling, intermittently since last Wednesday, worsens with movement     Have you seen another provider for this condition recently- Yes, [facility]     Have you had any recent diagnostic testing for this condition- Yes, UA, labs, respiratory panel     Do you currently take any medications for this condition- Yes
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
## 2022-05-18 ENCOUNTER — HOSPITAL ENCOUNTER (EMERGENCY)
Facility: HOSPITAL | Age: 27
Discharge: HOME | End: 2022-05-18
Attending: EMERGENCY MEDICINE | Admitting: EMERGENCY MEDICINE

## 2022-05-18 ENCOUNTER — APPOINTMENT (OUTPATIENT)
Dept: GENERAL RADIOLOGY | Facility: HOSPITAL | Age: 27
End: 2022-05-18

## 2022-05-18 ENCOUNTER — APPOINTMENT (OUTPATIENT)
Dept: CT IMAGING | Facility: HOSPITAL | Age: 27
End: 2022-05-18

## 2022-05-18 VITALS
OXYGEN SATURATION: 98 % | HEART RATE: 85 BPM | DIASTOLIC BLOOD PRESSURE: 88 MMHG | BODY MASS INDEX: 30.46 KG/M2 | WEIGHT: 245 LBS | TEMPERATURE: 98.4 F | RESPIRATION RATE: 18 BRPM | HEIGHT: 75 IN | SYSTOLIC BLOOD PRESSURE: 130 MMHG

## 2022-05-18 DIAGNOSIS — H93.19 TINNITUS, UNSPECIFIED LATERALITY: ICD-10-CM

## 2022-05-18 DIAGNOSIS — R06.00 DYSPNEA, UNSPECIFIED TYPE: ICD-10-CM

## 2022-05-18 DIAGNOSIS — R21 RASH OF BACK: ICD-10-CM

## 2022-05-18 DIAGNOSIS — R42 DIZZINESS: Primary | ICD-10-CM

## 2022-05-18 LAB
ALBUMIN SERPL-MCNC: 4.7 G/DL (ref 3.5–5.2)
ALBUMIN/GLOB SERPL: 1.6 G/DL
ALP SERPL-CCNC: 57 U/L (ref 39–117)
ALT SERPL W P-5'-P-CCNC: 12 U/L (ref 1–41)
ANION GAP SERPL CALCULATED.3IONS-SCNC: 8.6 MMOL/L (ref 5–15)
AST SERPL-CCNC: 19 U/L (ref 1–40)
BASOPHILS # BLD AUTO: 0.08 10*3/MM3 (ref 0–0.2)
BASOPHILS NFR BLD AUTO: 0.9 % (ref 0–1.5)
BILIRUB SERPL-MCNC: 0.2 MG/DL (ref 0–1.2)
BUN SERPL-MCNC: 15 MG/DL (ref 6–20)
BUN/CREAT SERPL: 17 (ref 7–25)
CALCIUM SPEC-SCNC: 9.4 MG/DL (ref 8.6–10.5)
CHLORIDE SERPL-SCNC: 101 MMOL/L (ref 98–107)
CO2 SERPL-SCNC: 29.4 MMOL/L (ref 22–29)
CREAT SERPL-MCNC: 0.88 MG/DL (ref 0.76–1.27)
DEPRECATED RDW RBC AUTO: 44.7 FL (ref 37–54)
EGFRCR SERPLBLD CKD-EPI 2021: 122.4 ML/MIN/1.73
EOSINOPHIL # BLD AUTO: 0.15 10*3/MM3 (ref 0–0.4)
EOSINOPHIL NFR BLD AUTO: 1.8 % (ref 0.3–6.2)
ERYTHROCYTE [DISTWIDTH] IN BLOOD BY AUTOMATED COUNT: 13.1 % (ref 12.3–15.4)
GLOBULIN UR ELPH-MCNC: 2.9 GM/DL
GLUCOSE SERPL-MCNC: 101 MG/DL (ref 65–99)
HCT VFR BLD AUTO: 46.6 % (ref 37.5–51)
HGB BLD-MCNC: 15.5 G/DL (ref 13–17.7)
HOLD SPECIMEN: NORMAL
HOLD SPECIMEN: NORMAL
IMM GRANULOCYTES # BLD AUTO: 0.02 10*3/MM3 (ref 0–0.05)
IMM GRANULOCYTES NFR BLD AUTO: 0.2 % (ref 0–0.5)
LYMPHOCYTES # BLD AUTO: 2.36 10*3/MM3 (ref 0.7–3.1)
LYMPHOCYTES NFR BLD AUTO: 28 % (ref 19.6–45.3)
MCH RBC QN AUTO: 30.9 PG (ref 26.6–33)
MCHC RBC AUTO-ENTMCNC: 33.3 G/DL (ref 31.5–35.7)
MCV RBC AUTO: 93 FL (ref 79–97)
MONOCYTES # BLD AUTO: 1.07 10*3/MM3 (ref 0.1–0.9)
MONOCYTES NFR BLD AUTO: 12.7 % (ref 5–12)
NEUTROPHILS NFR BLD AUTO: 4.75 10*3/MM3 (ref 1.7–7)
NEUTROPHILS NFR BLD AUTO: 56.4 % (ref 42.7–76)
NRBC BLD AUTO-RTO: 0 /100 WBC (ref 0–0.2)
PLATELET # BLD AUTO: 308 10*3/MM3 (ref 140–450)
PMV BLD AUTO: 10.5 FL (ref 6–12)
POTASSIUM SERPL-SCNC: 4.1 MMOL/L (ref 3.5–5.2)
PROT SERPL-MCNC: 7.6 G/DL (ref 6–8.5)
RBC # BLD AUTO: 5.01 10*6/MM3 (ref 4.14–5.8)
SODIUM SERPL-SCNC: 139 MMOL/L (ref 136–145)
TROPONIN T SERPL-MCNC: <0.01 NG/ML (ref 0–0.03)
WBC NRBC COR # BLD: 8.43 10*3/MM3 (ref 3.4–10.8)
WHOLE BLOOD HOLD COAG: NORMAL
WHOLE BLOOD HOLD SPECIMEN: NORMAL

## 2022-05-18 PROCEDURE — 71045 X-RAY EXAM CHEST 1 VIEW: CPT

## 2022-05-18 PROCEDURE — 84484 ASSAY OF TROPONIN QUANT: CPT | Performed by: EMERGENCY MEDICINE

## 2022-05-18 PROCEDURE — 93005 ELECTROCARDIOGRAM TRACING: CPT

## 2022-05-18 PROCEDURE — 99284 EMERGENCY DEPT VISIT MOD MDM: CPT

## 2022-05-18 PROCEDURE — 85025 COMPLETE CBC W/AUTO DIFF WBC: CPT | Performed by: EMERGENCY MEDICINE

## 2022-05-18 PROCEDURE — 80053 COMPREHEN METABOLIC PANEL: CPT | Performed by: EMERGENCY MEDICINE

## 2022-05-18 PROCEDURE — 70450 CT HEAD/BRAIN W/O DYE: CPT

## 2022-05-18 PROCEDURE — 93005 ELECTROCARDIOGRAM TRACING: CPT | Performed by: EMERGENCY MEDICINE

## 2022-05-18 RX ORDER — SODIUM CHLORIDE 0.9 % (FLUSH) 0.9 %
10 SYRINGE (ML) INJECTION AS NEEDED
Status: DISCONTINUED | OUTPATIENT
Start: 2022-05-18 | End: 2022-05-18 | Stop reason: HOSPADM

## 2022-05-18 RX ORDER — FLUTICASONE PROPIONATE 50 MCG
2 SPRAY, SUSPENSION (ML) NASAL DAILY
COMMUNITY

## 2022-05-18 RX ORDER — LORATADINE 10 MG/1
CAPSULE, LIQUID FILLED ORAL
COMMUNITY

## 2022-05-18 RX ORDER — MECLIZINE HYDROCHLORIDE 25 MG/1
25 TABLET ORAL ONCE
Status: COMPLETED | OUTPATIENT
Start: 2022-05-18 | End: 2022-05-18

## 2022-05-18 RX ADMIN — MECLIZINE HYDROCHLORIDE 25 MG: 25 TABLET ORAL at 18:30

## 2022-05-18 NOTE — ED PROVIDER NOTES
Subjective   History of Present Illness    Chief Complaint: Dizziness and ringing in the ear  History of Present Illness: 30-year-old male presents with dizziness and ringing in the ears.  Intermittent recurrent symptoms over the last week.  States he felt like he might pass out no headache no vision changes no focal weakness.  Today developed shortness of breath.  Scheduled to see neurologist tomorrow.  No palpitations no sick contacts no travel.  Onset: See above timeline  Duration: Intermittent recurrent  Exacerbating / Alleviating factors: None  Associated symptoms: None      Nurses Notes reviewed and agree, including vitals, allergies, social history and prior medical history.     REVIEW OF SYSTEMS: All systems reviewed and not pertinent unless noted.    Positive for: Dizziness, ringing in the ears, shortness of breath, rash to the right scapular region    Negative for: Fever cough hemoptysis syncope chest pain palpitations abdominal pain back pain urinary symptoms headache vision loss  Review of Systems    History reviewed. No pertinent past medical history.    No Known Allergies    Past Surgical History:   Procedure Laterality Date   • SPLENECTOMY         History reviewed. No pertinent family history.    Social History     Socioeconomic History   • Marital status: Single   Tobacco Use   • Smoking status: Never Smoker   • Smokeless tobacco: Never Used   Vaping Use   • Vaping Use: Never used   Substance and Sexual Activity   • Alcohol use: Yes     Comment: pt reports occasionally   • Drug use: No   • Sexual activity: Defer           Objective   Physical Exam    CONSTITUTIONAL: Well developed, nontoxic healthy-appearing 26-year-old  male,  in no acute distress.  VITAL SIGNS: per nursing, reviewed and noted  SKIN: exposed skin with localized two centimeter erythematous nondescript rash to the right scapular region.  No vesicles no cellulitis.  Also has 2 pinpoint areas of folliculitis to the left  anterior thigh.  No bull's-eye rash  EYES: perrla. EOMI. no icterus  ENT: Normal voice.  No posterior pharyngeal erythema or exudate.  TM clear bilaterally.  Normal nares.  Moist mucous membranes   RESPIRATORY:  No increased work of breathing. No retractions.  Chest clear auscultation bilaterally  CARDIOVASCULAR:  regular rate and rhythm, no murmurs.  Good Peripheral pulses. Good cap refill to extremities.   GI: Abdomen soft, nontender, normal bowel sounds. No hernia. No ascites.  MUSCULOSKELETAL:  No tenderness. Full ROM. Strength and tone grossly normal.  no spasms. no neck or back tenderness or spasm.   NEUROLOGIC: Alert, oriented x 3. No gross deficits. GCS 15.  NIH stroke scale 0  PSYCH: appropriate affect.  Lymphatics: No cervical lymphadenopathy      Procedures       No attending physician procedures were performed on this patient.    ED Course  ED Course as of 05/18/22 1845   Wed May 18, 2022   1706 EKG interpreted by me reveals sinus rhythm rate of 79.  No ectopy no ischemic changes [PF]   1738 Troponin T: <0.010  Glucose 101   BUN 15   Creatinine 0.88   Sodium 139   Potassium 4.1   Chloride 101   CO2 29.4   Calcium 9.4   Total Protein 7.6   Albumin 4.70   ALT (SGPT) 12   AST (SGOT) 19   Alkaline Phosphatase 57   Total Bilirubin 0.2    [PF]   1738 WBC 8.43   RBC 5.01   Hemoglobin 15.5   Hematocrit 46.6   MCV 93.0   MCH 30.9   MCHC 33.3   RDW 13.1   RDW-SD 44.7   MPV 10.5   Platelets 308    [PF]   1821 Standing, bp 130/88, sats 99 % ra, hr, 81.  [PF]      ED Course User Index  [PF] Jesus Hightower,       CT Head Without Contrast    Result Date: 5/18/2022  FINAL REPORT TECHNIQUE: Multiple axial CT images were performed from the foramen magnum to the vertex. This study was performed with techniques to keep radiation doses as low as reasonably achievable (ALARA). Individualized dose reduction techniques using automated exposure control or adjustment of mA and/or kV according to the patient's size were  employed. CLINICAL HISTORY: dizziness FINDINGS: No acute intracranial hemorrhage or large acute cortical infarct.  The brain volume is normal for patient's age. Ventricles are normal in size and configuration.  No midline shift.  The basal cisterns are patent.  No skull fracture.  The visualized paranasal sinuses and mastoid air cells are clear.     Impression: No acute intracranial hemorrhage or large acute cortical infarct. Authenticated by Edin Arriaza MD on 05/18/2022 05:59:08 PM         Chest x-ray interpreted by me shows no evidence of any cardiomegaly, effusion, infiltrate, or bony abnormality.                                      MDM  26-year-old male presents with dizziness, ringing in the ears, rash to the back, shortness of breath today.  No evidence of ACS no arrhythmia, normotensive, PERC negative.  No focal neurologic deficits, CT head without acute findings.  No orthostasis.  Reassuring CBC CMP troponin.  We will add 1 dose of meclizine here, as a trial given his reported dizziness and tinnitus.  Advised to keep outpatient follow-up with neurology.  Return precautions discussed.  Final diagnoses:   Dizziness   Tinnitus, unspecified laterality   Rash of back   Dyspnea, unspecified type       ED Disposition  ED Disposition     ED Disposition   Discharge    Condition   Stable    Comment   --             keep your neurology appointment.,          [facility] Emergency Department  [address] [address] [phone]    As needed, If symptoms worsen         Medication List      No changes were made to your prescriptions during this visit.          Jesus Hightower,   05/18/22 4811

## 2022-05-19 ENCOUNTER — OFFICE VISIT (OUTPATIENT)
Dept: NEUROLOGY | Facility: CLINIC | Age: 27
End: 2022-05-19

## 2022-05-19 VITALS
TEMPERATURE: 97.3 F | HEART RATE: 70 BPM | BODY MASS INDEX: 30.21 KG/M2 | SYSTOLIC BLOOD PRESSURE: 122 MMHG | DIASTOLIC BLOOD PRESSURE: 80 MMHG | OXYGEN SATURATION: 98 % | HEIGHT: 75 IN | WEIGHT: 243 LBS

## 2022-05-19 DIAGNOSIS — R55 SYNCOPE AND COLLAPSE: Primary | ICD-10-CM

## 2022-05-19 DIAGNOSIS — R42 DIZZINESS OF UNKNOWN CAUSE: ICD-10-CM

## 2022-05-19 PROCEDURE — 99214 OFFICE O/P EST MOD 30 MIN: CPT | Performed by: NURSE PRACTITIONER

## 2022-05-19 RX ORDER — VENLAFAXINE HYDROCHLORIDE 37.5 MG/1
37.5 CAPSULE, EXTENDED RELEASE ORAL DAILY
Qty: 30 CAPSULE | Refills: 1 | Status: SHIPPED | OUTPATIENT
Start: 2022-05-19 | End: 2022-07-12 | Stop reason: SDUPTHER

## 2022-05-19 RX ORDER — MECLIZINE HYDROCHLORIDE 25 MG/1
25 TABLET ORAL 3 TIMES DAILY PRN
COMMUNITY
End: 2022-08-27
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
## 2022-05-24 ENCOUNTER — HOSPITAL ENCOUNTER (OUTPATIENT)
Dept: SLEEP MEDICINE | Facility: HOSPITAL | Age: 27
Discharge: HOME | End: 2022-05-24
Admitting: NURSE PRACTITIONER

## 2022-05-24 DIAGNOSIS — R42 DIZZINESS OF UNKNOWN CAUSE: ICD-10-CM

## 2022-05-24 DIAGNOSIS — R55 SYNCOPE AND COLLAPSE: ICD-10-CM

## 2022-05-24 PROCEDURE — 95816 EEG AWAKE AND DROWSY: CPT
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
## 2022-05-26 ENCOUNTER — OFFICE VISIT (OUTPATIENT)
Dept: CARDIOLOGY | Facility: CLINIC | Age: 27
End: 2022-05-26

## 2022-05-26 ENCOUNTER — OFFICE VISIT (OUTPATIENT)
Dept: OTOLARYNGOLOGY | Facility: CLINIC | Age: 27
End: 2022-05-26

## 2022-05-26 VITALS
DIASTOLIC BLOOD PRESSURE: 88 MMHG | HEIGHT: 75 IN | WEIGHT: 241 LBS | OXYGEN SATURATION: 99 % | SYSTOLIC BLOOD PRESSURE: 130 MMHG | BODY MASS INDEX: 29.97 KG/M2 | HEART RATE: 114 BPM

## 2022-05-26 VITALS
WEIGHT: 241.8 LBS | HEART RATE: 88 BPM | SYSTOLIC BLOOD PRESSURE: 116 MMHG | BODY MASS INDEX: 30.06 KG/M2 | DIASTOLIC BLOOD PRESSURE: 72 MMHG | OXYGEN SATURATION: 98 % | HEIGHT: 75 IN

## 2022-05-26 DIAGNOSIS — R42 MAL DE DEBARQUEMENT: ICD-10-CM

## 2022-05-26 DIAGNOSIS — R55 SYNCOPE, UNSPECIFIED SYNCOPE TYPE: Primary | ICD-10-CM

## 2022-05-26 DIAGNOSIS — Z82.49 FAMILY HISTORY OF CARDIAC ARREST: ICD-10-CM

## 2022-05-26 DIAGNOSIS — G43.109 MIGRAINE WITH AURA AND WITHOUT STATUS MIGRAINOSUS, NOT INTRACTABLE: ICD-10-CM

## 2022-05-26 DIAGNOSIS — G43.109 OCULAR MIGRAINE: ICD-10-CM

## 2022-05-26 DIAGNOSIS — R07.2 PRECORDIAL PAIN: ICD-10-CM

## 2022-05-26 DIAGNOSIS — R41.89 BRAIN FOG: ICD-10-CM

## 2022-05-26 DIAGNOSIS — R06.02 SHORTNESS OF BREATH: ICD-10-CM

## 2022-05-26 PROCEDURE — 99214 OFFICE O/P EST MOD 30 MIN: CPT | Performed by: NURSE PRACTITIONER

## 2022-05-26 PROCEDURE — 99204 OFFICE O/P NEW MOD 45 MIN: CPT | Performed by: OTOLARYNGOLOGY

## 2022-05-26 NOTE — PROGRESS NOTES
"       ENT New Office Consult Note     Date of Consult: 2022     Patient Name: Paramjit Eng  MRN: [id number]   : 1995     Referring Provider: Orquidea Victor A*    Care Team: Patient Care Team:  Le Olson APRN as PCP - General (Family Medicine)  Amy Aaron MD as Consulting Physician (General Surgery)     Chief Complaint:    Chief Complaint   Patient presents with   • Dizziness     New pt is here today for dizziness.       History of Present Illness: Paramjit Eng is a 26 y.o. male who presents today for SYNCOPE WORKUP.        HE REPORTS 3 WEEK AGO ONSET OF \"BRAIN FOG\". ALSO IN THE PAST 3 WEEKS, HE HAS HAD 3 EPISODES OF SYNCOPE WITH PRESYNCOPAL SYMPTOMS OF TEMPORARY HEARING LOSS, LIGHTHEADEDNESS, AND ANTICIPATORY SYMPTOMS.    SYNCOPE CAN OCCUR WHILE WALKING BUT ALSO WHILE SITTING AND ALSO ONCE WHILE LAYING FLAT ON THE COUCH.    HE REPORTS MORE RECENT ONSET OF CHEST PRESSURE WAKING HIM UP AND RADIATING DOWN THE LEFT ARM.    HE REPORTS SOME MILD MALDEBARQUEMENT SYMPTOMS \"LIKE I AM DEEP SEE FISHING\".    HE HAS BEEN TO ER 2X WITH REPORTEDLY NORMAL EKG.     CT HEAD WAS NORMAL    HE SAW NEUROLOGY. EEG REPORTEDLY NORMAL    CARDIOLOGY VISIT IS LATER TODAY.        HE PRESENTS WITH MOM WHO ASSISTS WITH THE HISTORY.    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        PMH: MIGRAINE, OCULAR MIGRAINE  FH: HEALTHY YOUNGER BROTHER, EXTENSIVE CARDIAC HX IN FAMILY WITH MULTIPLE HEART ATTACKS IN 40-51YO.      Subjective      Review of Systems:   Review of Systems   Constitutional: Positive for fever.   Cardiovascular: Positive for chest pain.   Allergic/Immunologic: Positive for environmental allergies.   Neurological: Positive for syncope, weakness, light-headedness and numbness.   All other systems reviewed and are negative.     I have reviewed and confirmed the accuracy of the ROS as documented by the MA/LPN/RN Myah Mcgrath MD     Pertinent " items are noted in HPI.     Past Medical History:   Past Medical History:   Diagnosis Date   • Allergies    • Anxiety    • Fainting    • Headache    • Numbness    • Weakness        Past Surgical History:   Past Surgical History:   Procedure Laterality Date   • SPLENECTOMY         Family History:   Family History   Problem Relation Age of Onset   • Migraines Mother    • Diabetes Mother    • Hypertension Mother    • Epilepsy Maternal Aunt    • Stroke Maternal Grandmother    • Hypertension Maternal Grandmother    • Alcohol abuse Maternal Grandmother    • Diabetes Maternal Grandmother    • Heart disease Maternal Grandmother    • Mental illness Maternal Grandmother    • Migraines Maternal Grandfather    • Hypertension Maternal Grandfather    • Heart disease Maternal Grandfather    • Diabetes Maternal Grandfather    • Arthritis Maternal Grandfather    • Hyperlipidemia Maternal Grandfather    • Hypertension Paternal Grandmother    • Heart disease Paternal Grandmother    • Hypertension Paternal Grandfather    • Heart disease Paternal Grandfather    • Aneurysm Paternal Great-Grandfather    • Stroke Maternal Great-Grandmother    • Alzheimer's disease Maternal Great-Grandmother        Social History:   Social History     Socioeconomic History   • Marital status: Single   Tobacco Use   • Smoking status: Never Smoker   • Smokeless tobacco: Never Used   Vaping Use   • Vaping Use: Never used   Substance and Sexual Activity   • Alcohol use: Yes     Comment: pt reports occasionally   • Drug use: No   • Sexual activity: Defer       Medications:     Current Outpatient Medications:   •  ELDERBERRY PO, Take  by mouth., Disp: , Rfl:   •  fluticasone (FLONASE) 50 MCG/ACT nasal spray, 2 sprays into the nostril(s) as directed by provider Daily., Disp: , Rfl:   •  Loratadine 10 MG capsule, Take  by mouth., Disp: , Rfl:   •  Multiple Vitamins-Minerals (MULTIVITAMIN WITH MINERALS) tablet tablet, Take 1 tablet by mouth Daily., Disp: , Rfl:   •   "venlafaxine XR (Effexor XR) 37.5 MG 24 hr capsule, Take 1 capsule by mouth Daily., Disp: 30 capsule, Rfl: 1  •  meclizine (ANTIVERT) 25 MG tablet, Take 25 mg by mouth 3 (Three) Times a Day As Needed for Dizziness., Disp: , Rfl:     Allergies:   No Known Allergies    Objective     Physical Exam:  Vital Signs:   Vitals:    05/26/22 1000   BP: 116/72   Pulse: 88   SpO2: 98%   Weight: 110 kg (241 lb 12.8 oz)   Height: 190.5 cm (75\")     Body mass index is 30.22 kg/m².     Ears: hearing with OUT difficulty, auricles intact without deformity, external auditory canals clear    RIGHT: tympanic membrane intact without perforation or visible effusion    LEFT: tympanic membrane intact without perforation or visible effusion    General: alert, interactive, no acute distress  Head: normocephalic, atraumatic  Nose: no significant external deformity, no epistaxis   Mouth: lips intact without deformity   Neck: trachea midline, no neck asymmetry   Lung: no stridor or stertor, no respiratory distress  Cardiovascular: no cyanosis  Skin: no concerning skin lesions on face  Neuro: Cranial nerves II-XII otherwise grossly intact  Eye: no pathologic nystagmus  Extremities: no motor asymmetry on gross examination  Psych: appropriately interactive        Results Review:   Labs:      Imaging:   EEG    Result Date: 5/24/2022  Normal study This report is transcribed using the Dragon dictation system.      CT Head Without Contrast    Result Date: 5/18/2022  No acute intracranial hemorrhage or large acute cortical infarct. Authenticated by Edin Arriaza MD on 05/18/2022 05:59:08 PM    XR Chest 1 View    Result Date: 5/19/2022  Unremarkable portable chest.  This report was signed and finalized on 5/19/2022 8:42 AM by Michael Bonds MD.    XR Chest 1 View    Result Date: 5/11/2022  Unremarkable portable chest.  This report was signed and finalized on 5/11/2022 11:08 AM by Michael Bonds MD.      These studies were independently reviewed today. "         Assessment / Plan      Assessment/Plan:   Diagnoses and all orders for this visit:    1. Syncope, unspecified syncope type (Primary)    2. Brain fog    3. Migraine with aura and without status migrainosus, not intractable    4. Ocular migraine    5. Mal de debarquement    6. Family history of cardiac arrest         [facility] REPORTS ACUTE ONSET OF BRAIN-FOG, RECURRENT SYNCOPE, AND MORE RECENT CHEST PRESSURE RADIATING DOWN THE ARM. HIS FAMILY HISTORY AND SYMPTOMS ARE CONCERNING FOR POSSIBLE CARDIAC SOURCE. WE ALSO DISCUSSED VARIOUS OTHER ETIOLOGIES, INCLUDING BUT NOT LIMITED TO POTS, TRANSFORMED MIGRAINE, SPELLS.    HE NEEDS CT TEMPORAL BONE, CT NECK W CONTRAST, MRI IAC PROTOCOL AND BRAIN W/WO TO RULE OUT PRIMARY MASS LESION IN SETTING OF ACUTE ONSET NEW CONCERNING SYMPTOMS.    RCK 1MO.    KEEP CARDIOLOGY APPOINTMENT.       Follow Up:   No follow-ups on file.      HAYDEN Mcgrath MD

## 2022-06-02 ENCOUNTER — APPOINTMENT (OUTPATIENT)
Dept: SLEEP MEDICINE | Facility: HOSPITAL | Age: 27
End: 2022-06-02

## 2022-06-02 ENCOUNTER — OFFICE VISIT (OUTPATIENT)
Dept: NEUROLOGY | Facility: CLINIC | Age: 27
End: 2022-06-02

## 2022-06-02 ENCOUNTER — HOSPITAL ENCOUNTER (OUTPATIENT)
Dept: ULTRASOUND IMAGING | Facility: HOSPITAL | Age: 27
Discharge: HOME | End: 2022-06-02
Admitting: NURSE PRACTITIONER

## 2022-06-02 VITALS
OXYGEN SATURATION: 96 % | HEIGHT: 75 IN | TEMPERATURE: 98 F | HEART RATE: 83 BPM | BODY MASS INDEX: 29.84 KG/M2 | DIASTOLIC BLOOD PRESSURE: 80 MMHG | WEIGHT: 240 LBS | SYSTOLIC BLOOD PRESSURE: 130 MMHG

## 2022-06-02 DIAGNOSIS — R55 SYNCOPE AND COLLAPSE: Primary | ICD-10-CM

## 2022-06-02 DIAGNOSIS — R55 SYNCOPE AND COLLAPSE: ICD-10-CM

## 2022-06-02 DIAGNOSIS — R42 DIZZINESS OF UNKNOWN CAUSE: ICD-10-CM

## 2022-06-02 PROCEDURE — 99213 OFFICE O/P EST LOW 20 MIN: CPT | Performed by: NURSE PRACTITIONER

## 2022-06-02 PROCEDURE — 93880 EXTRACRANIAL BILAT STUDY: CPT

## 2022-06-02 NOTE — PROGRESS NOTES
Follow Up Neurology Office Visit      Patient Name: Paramjit Eng    Referring Physician: No ref. provider found    Chief Complaint:    Chief Complaint   Patient presents with   • Follow-up     Pt in office for a follow up        History of Present Illness: Paramjit Eng is a very pleasant 26 y.o. male who is here to follow up with Neurology for  Episodes of syncope. He has completed a 2-week work fur"[employer] and has been taking Effexor without difficulty. He reports that his fatigue has improved, and he has not had any further syncopal episodes. He is following with ENT and cardiology.     The following portions of the patient's history were reviewed and updated as appropriate: allergies, current medications, past family history, past medical history, past social history, past surgical history and problem list.    Subjective     Review of Systems:   Review of Systems   Constitutional: Negative for fatigue.   Neurological: Negative for syncope.   Psychiatric/Behavioral: Negative for sleep disturbance and depressed mood. The patient is not nervous/anxious.    All other systems reviewed and are negative.    Medications:     Current Outpatient Medications:   •  ELDERBERRY PO, Take  by mouth., Disp: , Rfl:   •  fluticasone (FLONASE) 50 MCG/ACT nasal spray, 2 sprays into the nostril(s) as directed by provider Daily., Disp: , Rfl:   •  Loratadine 10 MG capsule, Take  by mouth., Disp: , Rfl:   •  meclizine (ANTIVERT) 25 MG tablet, Take 25 mg by mouth 3 (Three) Times a Day As Needed for Dizziness., Disp: , Rfl:   •  Multiple Vitamins-Minerals (MULTIVITAMIN WITH MINERALS) tablet tablet, Take 1 tablet by mouth Daily., Disp: , Rfl:   •  venlafaxine XR (Effexor XR) 37.5 MG 24 hr capsule, Take 1 capsule by mouth Daily., Disp: 30 capsule, Rfl: 1    Allergies:   No Known Allergies    Objective     Physical Exam:  Vital Signs:   Vitals:    06/02/22 0948   BP: 130/80   BP Location: Right arm   Patient Position: Sitting   Cuff Size:  "Adult   Pulse: 83   Temp: 98 °F (36.7 °C)   SpO2: 96%   Weight: 109 kg (240 lb)   Height: 190.5 cm (75\")   PainSc: 0-No pain     Physical Exam  Vitals and nursing note reviewed.   Constitutional:       Appearance: Normal appearance.   Pulmonary:      Effort: Pulmonary effort is normal.   Neurological:      General: No focal deficit present.      Mental Status: He is oriented to person, place, and time.      Gait: Gait is intact.      Deep Tendon Reflexes: Strength normal.   Psychiatric:         Mood and Affect: Mood normal.         Speech: Speech normal.         Behavior: Behavior normal.         Thought Content: Thought content normal.         Judgment: Judgment normal.       Neurologic Exam     Mental Status   Oriented to person, place, and time.   Attention: normal. Concentration: normal.   Speech: speech is normal   Level of consciousness: alert  Knowledge: good.   Normal comprehension.     Cranial Nerves   Cranial nerves II through XII intact.     Motor Exam   Muscle bulk: normal  Overall muscle tone: normal    Strength   Strength 5/5 throughout.     Gait, Coordination, and Reflexes     Gait  Gait: normal    Tremor   Resting tremor: absent    EEG  Result Date: 5/24/2022  Normal study This report is transcribed using the Dragon dictation system.      CT Head Without Contrast  Result Date: 5/18/2022  No acute intracranial hemorrhage or large acute cortical infarct. Authenticated by Edin Arriaza MD on 05/18/2022 05:59:08 PM    US Carotid Bilateral  Result Date: 6/2/2022  No significant plaque within the carotid bulbs and no hemodynamically significant stenosis.  Elevated peak systolic velocities are likely due to hyperdynamic circulation.      Images were reviewed, interpreted, and dictated by ARLETTE Sweeney Transcribed by Shira Kennedy PA-C.  This report was signed and finalized on 6/2/2022 3:21 PM by ARLETTE Phillips.    Results Review:   I reviewed the patient's new clinical results.  I have " reviewed the patient's other medical records to include, labs, radiology and referrals.   I reviewed the patient's new imaging results and agree with the interpretation.    Assessment / Plan      Assessment/Plan:   Diagnoses and all orders for this visit:    1. Syncope and collapse (Primary)     Patient is doing well with current therapy. If symptoms recur, would consider sleep study and/or continuous EEG monitoring.   I have encouraged him to contact me with any new or worsening symptoms.     Follow Up:   Return if symptoms worsen or fail to improve.     HEATHER May  [facility]   AS THE PROVIDER, I PERSONALLY WORE PPE DURING ENTIRE FACE TO FACE ENCOUNTER IN CLINIC WITH THE PATIENT. PATIENT ALSO WORE PPE DURING ENTIRE FACE TO FACE ENCOUNTER EXCEPT FOR A MAX OF 30 SECONDS DURING NEUROLOGICAL EVALUATION OF CRANIAL NERVES AND THEN MASK WAS PLACED BACK OVER PATIENT FACE FOR REMAINDER OF VISIT. I WASHED MY HANDS BEFORE AND AFTER VISIT.    Please note that portions of this note may have been completed with a voice recognition program. Efforts were made to edit the dictations, but occasionally words are mistranscribed.

## 2022-06-10 ENCOUNTER — HOSPITAL ENCOUNTER (OUTPATIENT)
Dept: MRI IMAGING | Facility: HOSPITAL | Age: 27
Discharge: HOME | End: 2022-06-10
Admitting: OTOLARYNGOLOGY

## 2022-06-10 DIAGNOSIS — R55 SYNCOPE, UNSPECIFIED SYNCOPE TYPE: ICD-10-CM

## 2022-06-10 PROCEDURE — A9577 INJ MULTIHANCE: HCPCS | Performed by: OTOLARYNGOLOGY

## 2022-06-10 PROCEDURE — 70553 MRI BRAIN STEM W/O & W/DYE: CPT

## 2022-06-10 PROCEDURE — 0 GADOBENATE DIMEGLUMINE 529 MG/ML SOLUTION: Performed by: OTOLARYNGOLOGY

## 2022-06-10 RX ADMIN — GADOBENATE DIMEGLUMINE 21 ML: 529 INJECTION, SOLUTION INTRAVENOUS at 07:16

## 2022-06-13 ENCOUNTER — HOSPITAL ENCOUNTER (OUTPATIENT)
Dept: CT IMAGING | Facility: HOSPITAL | Age: 27
Discharge: HOME | End: 2022-06-13

## 2022-06-13 DIAGNOSIS — R55 SYNCOPE, UNSPECIFIED SYNCOPE TYPE: ICD-10-CM

## 2022-06-13 PROCEDURE — 70491 CT SOFT TISSUE NECK W/DYE: CPT

## 2022-06-13 PROCEDURE — 25010000002 IOPAMIDOL 61 % SOLUTION: Performed by: OTOLARYNGOLOGY

## 2022-06-13 PROCEDURE — 70480 CT ORBIT/EAR/FOSSA W/O DYE: CPT

## 2022-06-13 RX ADMIN — IOPAMIDOL 100 ML: 612 INJECTION, SOLUTION INTRAVENOUS at 07:35

## 2022-07-12 DIAGNOSIS — R42 DIZZINESS OF UNKNOWN CAUSE: ICD-10-CM

## 2022-07-12 DIAGNOSIS — R55 SYNCOPE AND COLLAPSE: ICD-10-CM

## 2022-07-12 RX ORDER — VENLAFAXINE HYDROCHLORIDE 37.5 MG/1
37.5 CAPSULE, EXTENDED RELEASE ORAL DAILY
Qty: 90 CAPSULE | Refills: 1 | Status: SHIPPED | OUTPATIENT
Start: 2022-07-12 | End: 2022-11-03 | Stop reason: SDUPTHER

## 2022-08-23 ENCOUNTER — TELEPHONE (OUTPATIENT)
Dept: CARDIOLOGY | Facility: CLINIC | Age: 27
End: 2022-08-23

## 2022-11-03 DIAGNOSIS — R55 SYNCOPE AND COLLAPSE: ICD-10-CM

## 2022-11-03 DIAGNOSIS — R42 DIZZINESS OF UNKNOWN CAUSE: ICD-10-CM

## 2022-11-03 RX ORDER — VENLAFAXINE HYDROCHLORIDE 37.5 MG/1
37.5 CAPSULE, EXTENDED RELEASE ORAL DAILY
Qty: 90 CAPSULE | Refills: 1 | Status: SHIPPED | OUTPATIENT
Start: 2022-11-03 | End: 2023-01-19 | Stop reason: SDUPTHER

## 2023-01-19 DIAGNOSIS — R55 SYNCOPE AND COLLAPSE: ICD-10-CM

## 2023-01-19 DIAGNOSIS — R42 DIZZINESS OF UNKNOWN CAUSE: ICD-10-CM

## 2023-01-20 RX ORDER — VENLAFAXINE HYDROCHLORIDE 37.5 MG/1
37.5 CAPSULE, EXTENDED RELEASE ORAL DAILY
Qty: 90 CAPSULE | Refills: 1 | Status: SHIPPED | OUTPATIENT
Start: 2023-01-20

## 2023-10-05 ENCOUNTER — OFFICE VISIT (OUTPATIENT)
Dept: NEUROLOGY | Facility: CLINIC | Age: 28
End: 2023-10-05
Payer: COMMERCIAL

## 2023-10-05 VITALS
WEIGHT: 260.6 LBS | HEART RATE: 77 BPM | TEMPERATURE: 97.1 F | BODY MASS INDEX: 32.4 KG/M2 | DIASTOLIC BLOOD PRESSURE: 72 MMHG | OXYGEN SATURATION: 99 % | HEIGHT: 75 IN | SYSTOLIC BLOOD PRESSURE: 128 MMHG | RESPIRATION RATE: 14 BRPM

## 2023-10-05 DIAGNOSIS — F41.9 ANXIETY: Primary | ICD-10-CM

## 2023-10-05 DIAGNOSIS — R55 SYNCOPE AND COLLAPSE: ICD-10-CM

## 2023-10-05 PROCEDURE — 99214 OFFICE O/P EST MOD 30 MIN: CPT | Performed by: NURSE PRACTITIONER

## 2023-10-05 RX ORDER — VENLAFAXINE HYDROCHLORIDE 75 MG/1
75 CAPSULE, EXTENDED RELEASE ORAL DAILY
Qty: 90 CAPSULE | Refills: 1 | Status: SHIPPED | OUTPATIENT
Start: 2023-10-05

## 2023-10-05 NOTE — PROGRESS NOTES
Follow Up Office Visit      Patient Name: Paramjit Eng  : 1995   MRN: [id number]     Chief Complaint:    Chief Complaint   Patient presents with    Follow-up     Patient is in office to follow up on Syncope episodes. Patient reports it has improved a lot.        History of Present Illness: Paramjit Eng is a 28 y.o. male who is here today to follow up with neurology for syncope and collapse. He was seen in clinic last  (Kayleigh) and has been taking Venlafaxine 37.5 mg as directed with good compliance and tolerance. NO SI/HI. Fatigue is improved. No syncope or collapse of tunnel vision. No palpations. He was having lots of stress last year and feels this was the cause. Work is going well and just a promotion. Had migraines in his PMH and feels he no longer has these with medication use. Has been focusing on stress reduction. Cleared by Cardiology (Deandre). Cleared by ENT (Alcides).     Recent Imaging:    CT Soft Tissue Neck W  - noncontributory  CT Temporal Bones WO  -noncontributory  MRI Internal Auditory Canal W/WO  -noncontributory  CDUS  - noncontributory  CT Head WO  -noncontributory    Subjective      Review of Systems:   Review of Systems   Constitutional: Negative.  Negative for fever.   HENT: Negative.     Eyes: Negative.    Respiratory: Negative.     Cardiovascular: Negative.    Gastrointestinal: Negative.    Endocrine: Negative.    Genitourinary: Negative.    Musculoskeletal: Negative.    Skin: Negative.    Allergic/Immunologic: Negative.    Neurological:  Negative for dizziness, tremors, seizures, syncope, facial asymmetry, speech difficulty, weakness, light-headedness, numbness, headache, memory problem and confusion.   Hematological: Negative.    Psychiatric/Behavioral:  Positive for stress. Negative for suicidal ideas and depressed mood. The patient is nervous/anxious.    All other systems reviewed and are negative.    I have reviewed and the following portions  "of the patient's history were updated as appropriate: past family history, past medical history, past social history, past surgical history and problem list.    Medications:     Current Outpatient Medications:     ELDERBERRY PO, Take  by mouth., Disp: , Rfl:     fluticasone (FLONASE) 50 MCG/ACT nasal spray, 2 sprays into the nostril(s) as directed by provider Daily., Disp: , Rfl:     Loratadine 10 MG capsule, Take  by mouth., Disp: , Rfl:     Multiple Vitamins-Minerals (MULTIVITAMIN WITH MINERALS) tablet tablet, Take 1 tablet by mouth Daily., Disp: , Rfl:     venlafaxine XR (Effexor XR) 75 MG 24 hr capsule, Take 1 capsule by mouth Daily., Disp: 90 capsule, Rfl: 1    Allergies:   No Known Allergies    Objective     Physical Exam:  Vital Signs:   Vitals:    10/05/23 0825   BP: 128/72   BP Location: Right arm   Patient Position: Sitting   Cuff Size: Adult   Pulse: 77   Resp: 14   Temp: 97.1 °F (36.2 °C)   TempSrc: Infrared   SpO2: 99%   Weight: 118 kg (260 lb 9.6 oz)   Height: 190.5 cm (75\")   PainSc: 0-No pain     Body mass index is 32.57 kg/m².    Physical Exam  Vitals and nursing note reviewed.   Constitutional:       General: He is not in acute distress.     Appearance: Normal appearance. He is normal weight.   HENT:      Head: Normocephalic.      Nose: Nose normal.      Mouth/Throat:      Mouth: Mucous membranes are moist.      Pharynx: Oropharynx is clear.   Eyes:      Extraocular Movements: Extraocular movements intact.      Conjunctiva/sclera: Conjunctivae normal.      Pupils: Pupils are equal, round, and reactive to light.   Musculoskeletal:         General: Normal range of motion.      Cervical back: Normal range of motion and neck supple. No rigidity.   Skin:     General: Skin is warm and dry.      Capillary Refill: Capillary refill takes less than 2 seconds.   Neurological:      General: No focal deficit present.      Mental Status: He is alert and oriented to person, place, and time.   Psychiatric:         " Mood and Affect: Mood normal.         Behavior: Behavior normal.         Thought Content: Thought content normal.         Judgment: Judgment normal.       Neurologic Exam     Mental Status   Oriented to person, place, and time.     Cranial Nerves     CN III, IV, VI   Pupils are equal, round, and reactive to light.     Assessment / Plan      Assessment/Plan:   Diagnoses and all orders for this visit:    1. Anxiety (Primary)  -     venlafaxine XR (Effexor XR) 75 MG 24 hr capsule; Take 1 capsule by mouth Daily.  Dispense: 90 capsule; Refill: 1    2. Syncope and collapse  -     venlafaxine XR (Effexor XR) 75 MG 24 hr capsule; Take 1 capsule by mouth Daily.  Dispense: 90 capsule; Refill: 1         Follow Up:   Return in about 3 months (around 1/5/2024), or if symptoms worsen or fail to improve (telehealth).    Anticipatory Guidance and Safety Reviewed  Patient Education Provided  Mental health promotion strategies reviewed and encouraged: sleep hygiene, positive social support, exercise > 150 min/week.   Increase Venlafaxine to 75 mg Daily #90 with refills x 1; SE reviewed.   Consider sleep study and EEG with continued syncopal issues.  Niot interested in CBT referral today.   Instructed to go to ER with onset of SI/HI     RTC PRN or within 8-12 weeks (if stable at this time, may FU with PCP for medications)    Tatiana Jung, KIM, APRN, FNP-C  [facility]

## 2024-01-13 ENCOUNTER — APPOINTMENT (OUTPATIENT)
Dept: ULTRASOUND IMAGING | Facility: HOSPITAL | Age: 29
End: 2024-01-13
Payer: COMMERCIAL

## 2024-01-13 ENCOUNTER — HOSPITAL ENCOUNTER (OUTPATIENT)
Facility: HOSPITAL | Age: 29
Setting detail: OBSERVATION
Discharge: HOME | End: 2024-01-14
Attending: EMERGENCY MEDICINE | Admitting: INTERNAL MEDICINE
Payer: COMMERCIAL

## 2024-01-13 ENCOUNTER — APPOINTMENT (OUTPATIENT)
Dept: CT IMAGING | Facility: HOSPITAL | Age: 29
End: 2024-01-13
Attending: EMERGENCY MEDICINE
Payer: COMMERCIAL

## 2024-01-13 ENCOUNTER — APPOINTMENT (OUTPATIENT)
Dept: GENERAL RADIOLOGY | Facility: HOSPITAL | Age: 29
End: 2024-01-13
Attending: EMERGENCY MEDICINE
Payer: COMMERCIAL

## 2024-01-13 DIAGNOSIS — I26.99 BILATERAL PULMONARY EMBOLISM (HCC): Primary | ICD-10-CM

## 2024-01-13 DIAGNOSIS — I26.99 PULMONARY EMBOLISM WITH INFARCTION (HCC): ICD-10-CM

## 2024-01-13 PROBLEM — R07.81 PLEURITIC CHEST PAIN: Status: ACTIVE | Noted: 2024-01-13

## 2024-01-13 PROBLEM — R59.0 THORACIC LYMPHADENOPATHY: Status: ACTIVE | Noted: 2024-01-13

## 2024-01-13 LAB
ALBUMIN SERPL-MCNC: 4.1 G/DL (ref 3.4–4.8)
ALBUMIN/GLOB SERPL: 1.2 {RATIO} (ref 0.8–2)
ALP SERPL-CCNC: 60 U/L (ref 25–100)
ALT SERPL-CCNC: 15 U/L (ref 4–36)
AMPHET UR QL SCN: NORMAL
ANION GAP SERPL CALCULATED.3IONS-SCNC: 9 MMOL/L (ref 3–16)
AST SERPL-CCNC: 18 U/L (ref 8–33)
BARBITURATES UR QL SCN: NORMAL
BASOPHILS # BLD: 0.1 K/UL (ref 0–0.1)
BASOPHILS NFR BLD: 0.7 %
BENZODIAZ UR QL SCN: NORMAL
BILIRUB SERPL-MCNC: <0.2 MG/DL (ref 0.3–1.2)
BUN SERPL-MCNC: 18 MG/DL (ref 6–20)
BUPRENORPHINE QUAL, URINE: NORMAL
CALCIUM SERPL-MCNC: 9.1 MG/DL (ref 8.5–10.5)
CANNABINOIDS UR QL SCN: NORMAL
CHLORIDE SERPL-SCNC: 100 MMOL/L (ref 98–107)
CO2 SERPL-SCNC: 25 MMOL/L (ref 20–30)
COCAINE UR QL SCN: NORMAL
CREAT SERPL-MCNC: 0.8 MG/DL (ref 0.4–1.2)
D DIMER PPP DDU-MCNC: >4 UG/ML FEU (ref 0–0.6)
DRUG SCREEN COMMENT UR-IMP: NORMAL
EOSINOPHIL # BLD: 0 K/UL (ref 0–0.4)
EOSINOPHIL NFR BLD: 0.3 %
ERYTHROCYTE [DISTWIDTH] IN BLOOD BY AUTOMATED COUNT: 13 % (ref 11–16)
FLUAV AG NPH QL: NEGATIVE
FLUBV AG NPH QL: NEGATIVE
GFR SERPLBLD CREATININE-BSD FMLA CKD-EPI: >60 ML/MIN/{1.73_M2}
GLOBULIN SER CALC-MCNC: 3.3 G/DL
GLUCOSE SERPL-MCNC: 128 MG/DL (ref 74–106)
HCT VFR BLD AUTO: 44.4 % (ref 40–54)
HGB BLD-MCNC: 14.9 G/DL (ref 13–18)
IMM GRANULOCYTES # BLD: 0 K/UL
IMM GRANULOCYTES NFR BLD: 0.3 % (ref 0–5)
LYMPHOCYTES # BLD: 2.4 K/UL (ref 1.5–4)
LYMPHOCYTES NFR BLD: 20.5 %
MCH RBC QN AUTO: 30.3 PG (ref 27–32)
MCHC RBC AUTO-ENTMCNC: 33.6 G/DL (ref 31–35)
MCV RBC AUTO: 90.4 FL (ref 80–100)
METHADONE UR QL SCN: NORMAL
METHAMPHET UR QL SCN: NORMAL
MONOCYTES # BLD: 1.4 K/UL (ref 0.2–0.8)
MONOCYTES NFR BLD: 11.7 %
NEUTROPHILS # BLD: 7.8 K/UL (ref 2–7.5)
NEUTS SEG NFR BLD: 66.5 %
OPIATES UR QL SCN: NORMAL
OXYCODONE UR QL SCN: NORMAL
PCP UR QL SCN: NORMAL
PLATELET # BLD AUTO: 275 K/UL (ref 150–400)
PMV BLD AUTO: 10 FL (ref 6–10)
POTASSIUM SERPL-SCNC: 4.2 MMOL/L (ref 3.4–5.1)
PROPOXYPH UR QL SCN: NORMAL
PROT SERPL-MCNC: 7.4 G/DL (ref 6.4–8.3)
RBC # BLD AUTO: 4.91 M/UL (ref 4.5–6)
SARS-COV-2 RDRP RESP QL NAA+PROBE: NOT DETECTED
SODIUM SERPL-SCNC: 134 MMOL/L (ref 136–145)
TRICYCLICS UR QL SCN: NORMAL
TROPONIN, HIGH SENSITIVITY: <6 NG/L (ref 0–22)
WBC # BLD AUTO: 11.7 K/UL (ref 4–11)

## 2024-01-13 PROCEDURE — 99223 1ST HOSP IP/OBS HIGH 75: CPT | Performed by: PHYSICIAN ASSISTANT

## 2024-01-13 PROCEDURE — 36415 COLL VENOUS BLD VENIPUNCTURE: CPT

## 2024-01-13 PROCEDURE — 87635 SARS-COV-2 COVID-19 AMP PRB: CPT

## 2024-01-13 PROCEDURE — 6360000002 HC RX W HCPCS: Performed by: EMERGENCY MEDICINE

## 2024-01-13 PROCEDURE — 85379 FIBRIN DEGRADATION QUANT: CPT

## 2024-01-13 PROCEDURE — 80307 DRUG TEST PRSMV CHEM ANLYZR: CPT

## 2024-01-13 PROCEDURE — 87804 INFLUENZA ASSAY W/OPTIC: CPT

## 2024-01-13 PROCEDURE — 99285 EMERGENCY DEPT VISIT HI MDM: CPT

## 2024-01-13 PROCEDURE — 96372 THER/PROPH/DIAG INJ SC/IM: CPT

## 2024-01-13 PROCEDURE — 96375 TX/PRO/DX INJ NEW DRUG ADDON: CPT

## 2024-01-13 PROCEDURE — G0378 HOSPITAL OBSERVATION PER HR: HCPCS

## 2024-01-13 PROCEDURE — 71046 X-RAY EXAM CHEST 2 VIEWS: CPT

## 2024-01-13 PROCEDURE — 6360000002 HC RX W HCPCS: Performed by: PHYSICIAN ASSISTANT

## 2024-01-13 PROCEDURE — 93005 ELECTROCARDIOGRAM TRACING: CPT

## 2024-01-13 PROCEDURE — 6370000000 HC RX 637 (ALT 250 FOR IP): Performed by: PHYSICIAN ASSISTANT

## 2024-01-13 PROCEDURE — 80053 COMPREHEN METABOLIC PANEL: CPT

## 2024-01-13 PROCEDURE — 96374 THER/PROPH/DIAG INJ IV PUSH: CPT

## 2024-01-13 PROCEDURE — 85025 COMPLETE CBC W/AUTO DIFF WBC: CPT

## 2024-01-13 PROCEDURE — 84484 ASSAY OF TROPONIN QUANT: CPT

## 2024-01-13 PROCEDURE — 71260 CT THORAX DX C+: CPT

## 2024-01-13 PROCEDURE — 93970 EXTREMITY STUDY: CPT

## 2024-01-13 PROCEDURE — 6360000004 HC RX CONTRAST MEDICATION: Performed by: EMERGENCY MEDICINE

## 2024-01-13 RX ORDER — ONDANSETRON 4 MG/1
4 TABLET, ORALLY DISINTEGRATING ORAL EVERY 8 HOURS PRN
Status: DISCONTINUED | OUTPATIENT
Start: 2024-01-13 | End: 2024-01-14 | Stop reason: HOSPADM

## 2024-01-13 RX ORDER — ACETAMINOPHEN 325 MG/1
650 TABLET ORAL EVERY 6 HOURS PRN
Status: DISCONTINUED | OUTPATIENT
Start: 2024-01-13 | End: 2024-01-14 | Stop reason: HOSPADM

## 2024-01-13 RX ORDER — POLYETHYLENE GLYCOL 3350 17 G/17G
17 POWDER, FOR SOLUTION ORAL DAILY PRN
Status: DISCONTINUED | OUTPATIENT
Start: 2024-01-13 | End: 2024-01-14 | Stop reason: HOSPADM

## 2024-01-13 RX ORDER — ACETAMINOPHEN 650 MG/1
650 SUPPOSITORY RECTAL EVERY 6 HOURS PRN
Status: DISCONTINUED | OUTPATIENT
Start: 2024-01-13 | End: 2024-01-14 | Stop reason: HOSPADM

## 2024-01-13 RX ORDER — ENOXAPARIN SODIUM 150 MG/ML
1 INJECTION SUBCUTANEOUS ONCE
Status: COMPLETED | OUTPATIENT
Start: 2024-01-13 | End: 2024-01-13

## 2024-01-13 RX ORDER — KETOROLAC TROMETHAMINE 15 MG/ML
15 INJECTION, SOLUTION INTRAMUSCULAR; INTRAVENOUS ONCE
Status: COMPLETED | OUTPATIENT
Start: 2024-01-13 | End: 2024-01-13

## 2024-01-13 RX ORDER — OXYCODONE HYDROCHLORIDE 5 MG/1
10 TABLET ORAL EVERY 4 HOURS PRN
Status: DISCONTINUED | OUTPATIENT
Start: 2024-01-13 | End: 2024-01-14 | Stop reason: HOSPADM

## 2024-01-13 RX ORDER — M-VIT,TX,IRON,MINS/CALC/FOLIC 27MG-0.4MG
1 TABLET ORAL DAILY
Status: DISCONTINUED | OUTPATIENT
Start: 2024-01-13 | End: 2024-01-14 | Stop reason: HOSPADM

## 2024-01-13 RX ORDER — VENLAFAXINE 75 MG/1
75 TABLET ORAL DAILY
COMMUNITY

## 2024-01-13 RX ORDER — MORPHINE SULFATE 4 MG/ML
4 INJECTION, SOLUTION INTRAMUSCULAR; INTRAVENOUS EVERY 4 HOURS PRN
Status: DISCONTINUED | OUTPATIENT
Start: 2024-01-13 | End: 2024-01-14 | Stop reason: HOSPADM

## 2024-01-13 RX ORDER — ENOXAPARIN SODIUM 150 MG/ML
1 INJECTION SUBCUTANEOUS EVERY 12 HOURS
Status: DISCONTINUED | OUTPATIENT
Start: 2024-01-13 | End: 2024-01-13

## 2024-01-13 RX ORDER — ONDANSETRON 2 MG/ML
4 INJECTION INTRAMUSCULAR; INTRAVENOUS EVERY 6 HOURS PRN
Status: DISCONTINUED | OUTPATIENT
Start: 2024-01-13 | End: 2024-01-14 | Stop reason: HOSPADM

## 2024-01-13 RX ADMIN — IOPAMIDOL 100 ML: 755 INJECTION, SOLUTION INTRAVENOUS at 04:14

## 2024-01-13 RX ADMIN — MULTIPLE VITAMINS W/ MINERALS TAB 1 TABLET: TAB at 07:52

## 2024-01-13 RX ADMIN — MORPHINE SULFATE 4 MG: 4 INJECTION, SOLUTION INTRAMUSCULAR; INTRAVENOUS at 16:38

## 2024-01-13 RX ADMIN — APIXABAN 10 MG: 5 TABLET, FILM COATED ORAL at 17:32

## 2024-01-13 RX ADMIN — OXYCODONE HYDROCHLORIDE 10 MG: 5 TABLET ORAL at 13:34

## 2024-01-13 RX ADMIN — ENOXAPARIN SODIUM 105 MG: 150 INJECTION SUBCUTANEOUS at 05:45

## 2024-01-13 RX ADMIN — KETOROLAC TROMETHAMINE 15 MG: 15 INJECTION, SOLUTION INTRAMUSCULAR; INTRAVENOUS at 01:35

## 2024-01-13 RX ADMIN — KETOROLAC TROMETHAMINE 15 MG: 15 INJECTION, SOLUTION INTRAMUSCULAR; INTRAVENOUS at 20:17

## 2024-01-13 ASSESSMENT — PAIN SCALES - GENERAL
PAINLEVEL_OUTOF10: 10
PAINLEVEL_OUTOF10: 5
PAINLEVEL_OUTOF10: 4

## 2024-01-13 ASSESSMENT — PAIN DESCRIPTION - LOCATION
LOCATION: RIB CAGE
LOCATION: CHEST

## 2024-01-13 ASSESSMENT — PAIN DESCRIPTION - ORIENTATION: ORIENTATION: RIGHT

## 2024-01-13 NOTE — ED PROVIDER NOTES
.        [facility]  EMERGENCY DEPARTMENT ENCOUNTER        Pt Name: Shakir Fernandez  MRN: [id number]  Birthdate 1995  Date of evaluation: 1/13/2024  Provider: Sara Gavin MD  PCP: Janessa Herndon APRN  Note Started: 1:06 AM EST 1/13/24    CHIEF COMPLAINT       Chief Complaint   Patient presents with    Rib Pain       HISTORY OF PRESENT ILLNESS: 1 or more Elements     History from : Patient    Limitations to history : None    Shakir Fernandez is a 28 y.o. male who presents complaining of being short of breath for about the last 3 days he also has been having some lower back pain he went to urgent care yesterday was tested for COVID strep and flu all of which were negative the reason he came in tonight was he developed a pain in his right lower lateral rib cage about 4 hours ago which is sharp in nature worse when he tries to take a deep breath or lie down better when he stands he says he has had a little bit of a cough which he believes to be from postnasal drip.    Nursing Notes were all reviewed and agreed with or any disagreements were addressed in the HPI.    REVIEW OF SYSTEMS :      Review of Systems     systems reviewed and negative except as in HPI/MDM    SURGICAL HISTORY     Past Surgical History:   Procedure Laterality Date    SPLENECTOMY 2003       CURRENTMEDICATIONS       Previous Medications    ELDERBERRY PO    Take by mouth    MULTIPLE VITAMINS-MINERALS (THERAPEUTIC MULTIVITAMIN-MINERALS) TABLET    Take 1 tablet by mouth daily       ALLERGIES     Patient has no known allergies.    FAMILYHISTORY       Family History   Problem Relation Age of Onset    Diabetes Maternal Grandfather     Heart Disease Maternal Grandfather     Heart Disease Paternal Grandmother         death at early age    Heart Disease Paternal Grandfather         SOCIAL HISTORY       Social History     Tobacco Use    Smoking status: Never    Smokeless tobacco: Never   Substance Use Topics    Alcohol use: No

## 2024-01-13 NOTE — PROGRESS NOTES
Report called to maida and patient transferred to room 2 on medsur floor by wheelchair.  VSS A/O x 3

## 2024-01-13 NOTE — ED TRIAGE NOTES
Patient states that he was seen at urgent care yesterday with complaints of SOB. Pt tested negative for flu, strep, and covid. Patient states tonight he started having severe rib pain.

## 2024-01-13 NOTE — PROGRESS NOTES
4 Eyes Skin Assessment     NAME:  Shakir Fernandez  YOB: 1995  MEDICAL RECORD NUMBER:  [id number]    The patient is being assessed for  Admission    I agree that at least one RN has performed a thorough Head to Toe Skin Assessment on the patient. ALL assessment sites listed below have been assessed.      Areas assessed by both nurses:    Head, Face, Ears, Shoulders, Back, Chest, Arms, Elbows, Hands, Sacrum. Buttock, Coccyx, Ischium, and Legs. Feet and Heels        Does the Patient have a Wound? No noted wound(s)       Nasir Prevention initiated by RN: No  Wound Care Orders initiated by RN: No    Pressure Injury (Stage 3,4, Unstageable, DTI, NWPT, and Complex wounds) if present, place Wound referral order by RN under : No    New Ostomies, if present place, Ostomy referral order under : No     Nurse 1 eSignature: Electronically signed by Sravan Carlin LPN on 1/13/24 at 6:44 AM EST    **SHARE this note so that the co-signing nurse can place an eSignature**    Nurse 2 eSignature: Electronically signed by Blu Cota RN on 1/13/24 at 6:45 AM EST

## 2024-01-13 NOTE — H&P
also maintained on room air.  Patient states right-sided rib/chest pain has improved and nearly resolved at this point.  -Long discussion with patient and his family at bedside regarding plan.  Patient will need hypercoagulable workup in 1 month and repeat CT chest imaging to further evaluate thoracic lymphadenopathy.  Explained to patient and family acute phase reactants will need 30 days to return to normal, thus hypercoagulable workup cannot be done any sooner than 30 days from now.  He will also need repeat CT chest to determine if thoracic lymphadenopathy has resolved.  If still present, at that time, can consider biopsy.  Thoracic lymphadenopathy likely reactive to acute PEs and pulmonary infarcts.  Reviewed CBC with differential.  No immature cells.  No report of any weight loss, night sweats or any other constitutional symptoms to suggest underlying malignancy.  -Plan to transition to [facility] later tonight.  If patient remains stable, can likely discharge tomorrow on Eliquis and with appropriate outpatient follow-up.  Family agreeable with this plan.  [facility]  He agrees with above recommendations and does not feel patient needs to be transferred at this time.  -check echo and BLE Duplex    01/13/2024    Pleuritic chest pain [R07.81]  -Secondary to pulmonary infarcts from acute pulmonary emboli  -Pain relieved with Toradol patient received in ER.  Patient has as needed IV morphine and as needed oxycodone ordered if needed.   01/13/2024    Thoracic lymphadenopathy [R59.0]  -Noted on CTA as above.  See under pulmonary emboli and infarcts above for plan. 01/13/2024     Patient's case was reviewed and discussed with Dr. Alvarenga over the phone.    JOE Black certifies per CMS regulation for 42 .15(a), that the patient may reasonably be expected to be discharged or transferred to a hospital within 96 hours after admission to [facility]

## 2024-01-14 VITALS
BODY MASS INDEX: 31.32 KG/M2 | TEMPERATURE: 98.6 F | HEART RATE: 90 BPM | RESPIRATION RATE: 16 BRPM | WEIGHT: 244 LBS | HEIGHT: 74 IN | SYSTOLIC BLOOD PRESSURE: 133 MMHG | DIASTOLIC BLOOD PRESSURE: 77 MMHG | OXYGEN SATURATION: 98 %

## 2024-01-14 LAB
ALBUMIN SERPL-MCNC: 3.8 G/DL (ref 3.4–4.8)
ALBUMIN/GLOB SERPL: 1.3 {RATIO} (ref 0.8–2)
ALP SERPL-CCNC: 53 U/L (ref 25–100)
ALT SERPL-CCNC: 14 U/L (ref 4–36)
ANION GAP SERPL CALCULATED.3IONS-SCNC: 8 MMOL/L (ref 3–16)
AST SERPL-CCNC: 12 U/L (ref 8–33)
BILIRUB SERPL-MCNC: 0.3 MG/DL (ref 0.3–1.2)
BUN SERPL-MCNC: 11 MG/DL (ref 6–20)
CALCIUM SERPL-MCNC: 9 MG/DL (ref 8.5–10.5)
CHLORIDE SERPL-SCNC: 103 MMOL/L (ref 98–107)
CO2 SERPL-SCNC: 26 MMOL/L (ref 20–30)
CREAT SERPL-MCNC: 0.8 MG/DL (ref 0.4–1.2)
ERYTHROCYTE [DISTWIDTH] IN BLOOD BY AUTOMATED COUNT: 13.2 % (ref 11–16)
GFR SERPLBLD CREATININE-BSD FMLA CKD-EPI: >60 ML/MIN/{1.73_M2}
GLOBULIN SER CALC-MCNC: 3 G/DL
GLUCOSE SERPL-MCNC: 107 MG/DL (ref 74–106)
HCT VFR BLD AUTO: 43.1 % (ref 40–54)
HGB BLD-MCNC: 14 G/DL (ref 13–18)
MCH RBC QN AUTO: 29.7 PG (ref 27–32)
MCHC RBC AUTO-ENTMCNC: 32.5 G/DL (ref 31–35)
MCV RBC AUTO: 91.3 FL (ref 80–100)
PLATELET # BLD AUTO: 295 K/UL (ref 150–400)
PMV BLD AUTO: 10.1 FL (ref 6–10)
POTASSIUM SERPL-SCNC: 4 MMOL/L (ref 3.4–5.1)
PROT SERPL-MCNC: 6.8 G/DL (ref 6.4–8.3)
RBC # BLD AUTO: 4.72 M/UL (ref 4.5–6)
SODIUM SERPL-SCNC: 137 MMOL/L (ref 136–145)
WBC # BLD AUTO: 11.3 K/UL (ref 4–11)

## 2024-01-14 PROCEDURE — G0378 HOSPITAL OBSERVATION PER HR: HCPCS

## 2024-01-14 PROCEDURE — 85027 COMPLETE CBC AUTOMATED: CPT

## 2024-01-14 PROCEDURE — 36415 COLL VENOUS BLD VENIPUNCTURE: CPT

## 2024-01-14 PROCEDURE — 6370000000 HC RX 637 (ALT 250 FOR IP): Performed by: PHYSICIAN ASSISTANT

## 2024-01-14 PROCEDURE — 99238 HOSP IP/OBS DSCHRG MGMT 30/<: CPT | Performed by: PHYSICIAN ASSISTANT

## 2024-01-14 PROCEDURE — 80053 COMPREHEN METABOLIC PANEL: CPT

## 2024-01-14 RX ORDER — OXYCODONE HYDROCHLORIDE 5 MG/1
5 TABLET ORAL EVERY 6 HOURS PRN
Qty: 28 TABLET | Refills: 0 | Status: SHIPPED | OUTPATIENT
Start: 2024-01-14 | End: 2024-01-21

## 2024-01-14 RX ADMIN — MULTIPLE VITAMINS W/ MINERALS TAB 1 TABLET: TAB at 08:05

## 2024-01-14 RX ADMIN — APIXABAN 10 MG: 5 TABLET, FILM COATED ORAL at 08:05

## 2024-01-14 RX ADMIN — OXYCODONE HYDROCHLORIDE 10 MG: 5 TABLET ORAL at 05:27

## 2024-01-14 ASSESSMENT — PAIN DESCRIPTION - LOCATION: LOCATION: CHEST

## 2024-01-14 ASSESSMENT — PAIN DESCRIPTION - ORIENTATION: ORIENTATION: RIGHT

## 2024-01-14 ASSESSMENT — PAIN SCALES - GENERAL: PAINLEVEL_OUTOF10: 5

## 2024-01-14 NOTE — DISCHARGE INSTR - DIET

## 2024-01-14 NOTE — FLOWSHEET NOTE
01/13/24 0634   Assessment   Charting Type Admission   Psychosocial   Psychosocial (WDL) WDL   Neurological   Neuro (WDL) WDL   Swallow Screening   Is the patient unable to remain alert for testing? No   Is the patient on a modified diet (thickened liquids) due to pre-existing dysphagia? No   Is there presence of existing enteral tube feeding via the stomach or nose? No   Is there presence of head-of-bed restrictions (less than 30 degrees)? No   Is there presence of tracheotomy tube? No   Is the patient ordered nothing-by-mouth status? No   3 oz Water Swallow Screen Pass   Lisa Coma Scale   Eye Opening 4   Best Verbal Response 5   Best Motor Response 6   [place] Coma Scale Score 15   HEENT (Head, Ears, Eyes, Nose, & Throat)   HEENT (WDL) WDL   Respiratory   Respiratory (WDL) X   Respiratory Pattern Regular   Respiratory Depth Normal   Respiratory Quality/Effort Unlabored   Chest Assessment Trachea midline;Chest expansion symmetrical   L Breath Sounds Diminished   R Breath Sounds Diminished   Breath Sounds   Breath Sounds Bilateral Diminished   Right Upper Lobe Diminished   Right Middle Lobe Diminished   Right Lower Lobe Diminished   Left Upper Lobe Diminished   Left Lower Lobe Diminished   Cardiac   Cardiac (WDL) WDL   Gastrointestinal   Abdominal (WDL) WDL   Genitourinary   Genitourinary (WDL) WDL   Peripheral Vascular   Peripheral Vascular (WDL) WDL   Edema None   Dual Clinician Skin Assessment   Dual Skin Assessment (4 Eyes) WDL   Second Clinical  (First and Last Name) Blu Cota   Skin Integumentary    Skin Integumentary (WDL) WDL   Musculoskeletal   Musculoskeletal (WDL) WDL       
   01/13/24 0821   Assessment   Charting Type Shift assessment   Psychosocial   Psychosocial (WDL) WDL   Neurological   Neuro (WDL) WDL   Level of Consciousness 0   [place] Coma Scale   Eye Opening 4   Best Verbal Response 5   Best Motor Response 6   Lisa Coma Scale Score 15   HEENT (Head, Ears, Eyes, Nose, & Throat)   HEENT (WDL) WDL   Respiratory   Respiratory (WDL) X   Respiratory Pattern Regular   Respiratory Depth Normal   Respiratory Quality/Effort Unlabored   Chest Assessment Chest expansion symmetrical   L Breath Sounds Diminished   R Breath Sounds Diminished   Breath Sounds   Right Upper Lobe Clear   Right Middle Lobe Clear   Right Lower Lobe Clear   Left Upper Lobe Clear   Left Lower Lobe Clear   Cardiac   Cardiac (WDL) WDL   Cardiac Monitor   Telemetry Box Number [id number]   Telemetry Monitor Alarm Parameters    Gastrointestinal   Abdominal (WDL) WDL   Genitourinary   Genitourinary (WDL) WDL   Peripheral Vascular   Peripheral Vascular (WDL) WDL   Edema None   Skin Integumentary    Skin Integumentary (WDL) WDL   Musculoskeletal   Musculoskeletal (WDL) WDL       
   01/13/24 2118   Assessment   Charting Type Shift assessment   Psychosocial   Psychosocial (WDL) WDL   Neurological   Neuro (WDL) WDL   Level of Consciousness 0   [place] Coma Scale   Eye Opening 4   Best Verbal Response 5   Best Motor Response 6   Lisa Coma Scale Score 15   NIHSS Stroke Scale   NIHSS Stroke Scale Assessed No   HEENT (Head, Ears, Eyes, Nose, & Throat)   HEENT (WDL) WDL   Respiratory   Respiratory (WDL) X   Respiratory Interventions H.O.B. elevated   Respiratory Pattern Regular   Respiratory Depth Normal   Respiratory Quality/Effort Unlabored   Chest Assessment Chest expansion symmetrical   L Breath Sounds Diminished   R Breath Sounds Diminished   Level of Activity/Mobility 0   Subcutaneous Air/Crepitus None   Breath Sounds   Right Upper Lobe Clear   Right Middle Lobe Diminished   Right Lower Lobe Diminished   Left Upper Lobe Clear   Left Lower Lobe Diminished   Cough/Sputum   Sputum How Obtained None   Cardiac   Cardiac (WDL) WDL   Rhythm Interpretation   Pulse 87   Cardiac Monitor   Telemetry Box Number [id number]   Telemetry Monitor Alarm Parameters    Gastrointestinal   Abdominal (WDL) WDL   Genitourinary   Genitourinary (WDL) WDL   Peripheral Vascular   Edema Right lower extremity;Left lower extremity   RLE Edema +1   LLE Edema +1   Skin Integumentary    Skin Integumentary (WDL) WDL   Musculoskeletal   Musculoskeletal (WDL) WDL   Care Plan - Discharge Planning Goals   Discharge to home or other facility with appropriate resources Identify barriers to discharge with patient and caregiver;Refer to discharge planning if patient needs post-hospital services based on physician order or complex needs related to functional status, cognitive ability or social support system       
   01/14/24 0703   Vital Signs   Temp 98.6 °F (37 °C)   Temp Source Oral   Pulse 90   Heart Rate Source Monitor   Respirations 16   /77   MAP (Calculated) 96   BP Location Right upper arm   BP Method Automatic   Patient Position Supine   Oxygen Therapy   SpO2 98 %   O2 Device None (Room air)       
Trace   Skin Integumentary    Skin Integumentary (WDL) WDL   Musculoskeletal   Musculoskeletal (WDL) WDL     Pt alert times 4- Am assessment completed breathing equal and unlabored -pt states it hurts to    take deep breaths at times- vital signs WNL's - pt oxygen 95 % on room air -no complaints of pain at this time-pt told to call out with any needs -will monitor

## 2024-01-14 NOTE — PROGRESS NOTES
Pt. discharged home- Pt received discharge instructions- Pt. verbalized understanding of all instructions- Pt. aware we will call in am with appointment for Echo follow up with PCP and Appointment for Oncology and Hematology-order papers given - pt verbalized understanding- Pt. Aware to come to ER if having increase in shortness of breath- pt stated he has a pulse oximetry monitor to monitor oxygen level at home- All questions answered-IV removed cath tip intact- Gauze and tape applied- Telemetry monitor removed and returned to unit # [id number].

## 2024-01-14 NOTE — PLAN OF CARE
Problem: Discharge Planning  Goal: Discharge to home or other facility with appropriate resources  Outcome: Progressing  Flowsheets (Taken 1/13/2024 2118)  Discharge to home or other facility with appropriate resources:   Identify barriers to discharge with patient and caregiver   Refer to discharge planning if patient needs post-hospital services based on physician order or complex needs related to functional status, cognitive ability or social support system     Problem: Pain  Goal: Verbalizes/displays adequate comfort level or baseline comfort level  Outcome: Progressing     Problem: Respiratory - Adult  Goal: Achieves optimal ventilation and oxygenation  Outcome: Progressing     Problem: Cardiovascular - Adult  Goal: Maintains optimal cardiac output and hemodynamic stability  Outcome: Progressing     Problem: Hematologic - Adult  Goal: Maintains hematologic stability  Outcome: Progressing

## 2024-01-14 NOTE — PROGRESS NOTES
Pt. given Disc of CT scan from radiology - pt aware to  new medications from [facility] -Pt left via ambulating in hallway with family at side- No acute distress noted all items taken and accounted for-

## 2024-01-14 NOTE — DISCHARGE SUMMARY
and remained in normal sinus rhythm.  Pain was controlled with 1 dose of as needed IV morphine and 2 doses of as needed oxycodone.  Oxygen saturations maintained on room air.  Long discussion with patient and his family at bedside.  PE appeared unprovoked although with recent change in activity level at work, this could have been the precipitating factor in setting of unknown hypercoagulable state/condition.  Therapeutic Lovenox was transitioned to Eliquis.  Patient given instructions on loading dose of Eliquis with transition to 5 mg twice daily after loading dose.  Patient understands he will need hypercoagulable workup in 1 month and repeat CT chest imaging to further evaluate the thoracic lymphadenopathy.  PCP to consider repeating CTA chest PE protocol in 2-4 weeks. Explained to patient and family the acute phase reactants will need 30 days to return to normal thus hypercoagulable workup cannot be done any sooner than 30 days from now.  He will also need repeat CT chest to determine if thoracic lymphadenopathy has resolved.  If still present, at that time, can consider biopsy.  Thoracic lymphadenopathy likely reactive to acute PEs and pulmonary infarcts.  Defer obtaining CTA chest PE protocol to PCP. Reviewed CBC with differential.  No immature cells.  No report of any weight loss, night sweats or any other constitutional symptoms to suggest underlying malignancy.  Case was also discussed with [facility] hospitalist Dr. Chaudhry.  He agreed with the above recommendations and declined to accept the patient in transfer as workup can be conducted as outpatient.  Plan to also obtain outpatient echo. BLE venous duplexes were obtained on 1/13, however, no results are available at this time. This was all discussed in depth with patient and his family and they are agreeable.  He will be provided a short course of oxycodone for any further pleuritic pain.  He will also be given a work excuse from 1/13-1/17.

## 2024-01-14 NOTE — PLAN OF CARE
Problem: Pain  Goal: Verbalizes/displays adequate comfort level or baseline comfort level  1/14/2024 0854 by Cassie Brown RN  Outcome: Progressing  1/13/2024 2140 by Alice Jeter RN  Outcome: Progressing     Problem: Respiratory - Adult  Goal: Achieves optimal ventilation and oxygenation  1/14/2024 0854 by Cassie Brown, RN  Outcome: Progressing  1/13/2024 2140 by Alice Jeter, RN  Outcome: Progressing

## 2024-01-15 NOTE — CARE COORDINATION
Spoke to patient and his mother. Patient states he is feeling better. He said side-effects to his medicines were not explained, but he has all he needs to take care of himself at home. He stated he is comfortable with his medications and does not need a call to have medications explained. Also, his mother works for Dr. Ballesteros at [employer], and Dr. Ballesteros has been following patient's health. Patient's mother and Dr. Ballesteros have contacted hematologist Dr. Choco Mak, whom patient has an appointment on 1/18/2024 at 1:30 PM. JOE Chaidez had asked for the patient to have an OP echo and hypercoagulable work up with hematology. I contacted Dr. Hernandez's office to relay that message. Patient also has a follow-up appointment with AISLINN Armstrong, 1/19/2024 at 3:15 PM. Patient had no further questions or comments at the time.

## 2024-01-16 ENCOUNTER — CARE COORDINATION (OUTPATIENT)
Dept: PRIMARY CARE CLINIC | Age: 29
End: 2024-01-16

## 2024-01-16 NOTE — CARE COORDINATION
Care Transitions Initial Follow Up Call    Call within 2 business days of discharge: Yes     Patient: Shakir Fernandez Patient : 1995 MRN: [id number]    Last Discharge Facility       Date Complaint Diagnosis Description Type Department Provider    24 Rib Pain Bilateral pulmonary embolism (HCC) ... ED to Hosp-Admission (Discharged) (ADMITTED) Rochelle Rayo MS, MD; Sara Gavin ...            RARS: No data recorded     Spoke with: Attempting HFU call, unsuccessful.  Message left with contact information.      Discharge department/facility: [facility]    Non-face-to-face services provided:  Scheduled appointment with PCP-Katrina  Obtained and reviewed discharge summary and/or continuity of care documents    Follow Up  Future Appointments   Date Time Provider Department Center   2024  3:00 PM Janessa Herndon APRN [facility]       Scar Hdz RN

## 2024-01-17 SDOH — ECONOMIC STABILITY: FOOD INSECURITY: WITHIN THE PAST 12 MONTHS, YOU WORRIED THAT YOUR FOOD WOULD RUN OUT BEFORE YOU GOT MONEY TO BUY MORE.: NEVER TRUE

## 2024-01-17 SDOH — ECONOMIC STABILITY: HOUSING INSECURITY
IN THE LAST 12 MONTHS, WAS THERE A TIME WHEN YOU DID NOT HAVE A STEADY PLACE TO SLEEP OR SLEPT IN A SHELTER (INCLUDING NOW)?: NO

## 2024-01-17 SDOH — ECONOMIC STABILITY: FOOD INSECURITY: WITHIN THE PAST 12 MONTHS, THE FOOD YOU BOUGHT JUST DIDN'T LAST AND YOU DIDN'T HAVE MONEY TO GET MORE.: NEVER TRUE

## 2024-01-17 SDOH — ECONOMIC STABILITY: INCOME INSECURITY: HOW HARD IS IT FOR YOU TO PAY FOR THE VERY BASICS LIKE FOOD, HOUSING, MEDICAL CARE, AND HEATING?: NOT HARD AT ALL

## 2024-01-17 SDOH — ECONOMIC STABILITY: TRANSPORTATION INSECURITY
IN THE PAST 12 MONTHS, HAS LACK OF TRANSPORTATION KEPT YOU FROM MEETINGS, WORK, OR FROM GETTING THINGS NEEDED FOR DAILY LIVING?: NO

## 2024-01-17 NOTE — CARE COORDINATION
Care Transitions Initial Follow Up Call    Call within 2 business days of discharge: Yes    Patient Current Location:  Home: [address] [place] [id number]    Care Transition Nurse contacted the patient by telephone to perform post hospital discharge assessment. Verified name and  with patient as identifiers. Provided introduction to self, and explanation of the Care Transition Nurse role.     Patient: Shakir Fernandez Patient : 1995   MRN: [id number]  Reason for Admission: bilateral pulmonary embolism  Discharge Date: 24 RARS: No data recorded    Last Discharge Facility       Date Complaint Diagnosis Description Type Department Provider    24 Rib Pain Bilateral pulmonary embolism (HCC) ... ED to Hosp-Admission (Discharged) (ADMITTED) Rochelle Rayo MS, MD; Sara Gavin ...            Was this an external facility discharge? No Discharge Facility: [facility]    Challenges to be reviewed by the provider   Additional needs identified to be addressed with provider: Yes  Hematology appointment               Method of communication with provider: chart routing.    Shakir tells me that he starting to feel much better.  He is able to get up and walk about the room easier and pain is lessened in his chest.  His appointment with hematology has been moved up to tomorrow.  We discussed his hfu with PCP on Friday.      Care Transition Nurse reviewed discharge instructions with patient who verbalized understanding. The patient was given an opportunity to ask questions and does not have any further questions or concerns at this time. Were discharge instructions available to patient? Yes. Reviewed appropriate site of care based on symptoms and resources available to patient including: PCP  Specialist. The patient agrees to contact the PCP office for questions related to their healthcare.     Advance Care Planning:   Does patient have an Advance Directive: not on file; education provided.    Medication

## 2024-01-18 ENCOUNTER — CONSULT (OUTPATIENT)
Dept: ONCOLOGY | Facility: CLINIC | Age: 29
End: 2024-01-18
Payer: COMMERCIAL

## 2024-01-18 ENCOUNTER — TELEPHONE (OUTPATIENT)
Dept: ONCOLOGY | Facility: CLINIC | Age: 29
End: 2024-01-18
Payer: COMMERCIAL

## 2024-01-18 ENCOUNTER — LAB (OUTPATIENT)
Dept: LAB | Facility: HOSPITAL | Age: 29
End: 2024-01-18
Payer: COMMERCIAL

## 2024-01-18 ENCOUNTER — APPOINTMENT (OUTPATIENT)
Dept: OTHER | Facility: HOSPITAL | Age: 29
End: 2024-01-18
Payer: COMMERCIAL

## 2024-01-18 VITALS
SYSTOLIC BLOOD PRESSURE: 136 MMHG | OXYGEN SATURATION: 98 % | WEIGHT: 255 LBS | DIASTOLIC BLOOD PRESSURE: 79 MMHG | BODY MASS INDEX: 32.73 KG/M2 | HEART RATE: 95 BPM | HEIGHT: 74 IN | RESPIRATION RATE: 12 BRPM | TEMPERATURE: 98.4 F

## 2024-01-18 DIAGNOSIS — I26.94 MULTIPLE SUBSEGMENTAL PULMONARY EMBOLI WITHOUT ACUTE COR PULMONALE: Primary | ICD-10-CM

## 2024-01-18 DIAGNOSIS — I26.94 MULTIPLE SUBSEGMENTAL PULMONARY EMBOLI WITHOUT ACUTE COR PULMONALE: ICD-10-CM

## 2024-01-18 LAB
ALBUMIN SERPL-MCNC: 4.2 G/DL (ref 3.5–5.2)
ALBUMIN/GLOB SERPL: 1 G/DL
ALP SERPL-CCNC: 56 U/L (ref 39–117)
ALT SERPL W P-5'-P-CCNC: 18 U/L (ref 1–41)
ANION GAP SERPL CALCULATED.3IONS-SCNC: 12 MMOL/L (ref 5–15)
AST SERPL-CCNC: 15 U/L (ref 1–40)
BASOPHILS # BLD AUTO: 0.1 10*3/MM3 (ref 0–0.2)
BASOPHILS NFR BLD AUTO: 1.2 % (ref 0–1.5)
BILIRUB SERPL-MCNC: 0.3 MG/DL (ref 0–1.2)
BUN SERPL-MCNC: 16 MG/DL (ref 6–20)
BUN/CREAT SERPL: 21.1 (ref 7–25)
CALCIUM SPEC-SCNC: 9.5 MG/DL (ref 8.6–10.5)
CHLORIDE SERPL-SCNC: 99 MMOL/L (ref 98–107)
CO2 SERPL-SCNC: 27 MMOL/L (ref 22–29)
CREAT SERPL-MCNC: 0.76 MG/DL (ref 0.76–1.27)
DEPRECATED RDW RBC AUTO: 41 FL (ref 37–54)
EGFRCR SERPLBLD CKD-EPI 2021: 126.3 ML/MIN/1.73
EKG ATRIAL RATE: 91 BPM
EKG DIAGNOSIS: NORMAL
EKG P AXIS: 69 DEGREES
EKG P-R INTERVAL: 122 MS
EKG Q-T INTERVAL: 364 MS
EKG QRS DURATION: 86 MS
EKG QTC CALCULATION (BAZETT): 447 MS
EKG R AXIS: 60 DEGREES
EKG T AXIS: 24 DEGREES
EKG VENTRICULAR RATE: 91 BPM
EOSINOPHIL # BLD AUTO: 0.13 10*3/MM3 (ref 0–0.4)
EOSINOPHIL NFR BLD AUTO: 1.5 % (ref 0.3–6.2)
ERYTHROCYTE [DISTWIDTH] IN BLOOD BY AUTOMATED COUNT: 12.6 % (ref 12.3–15.4)
GLOBULIN UR ELPH-MCNC: 4.1 GM/DL
GLUCOSE SERPL-MCNC: 93 MG/DL (ref 65–99)
HCT VFR BLD AUTO: 42.8 % (ref 37.5–51)
HGB BLD-MCNC: 14.4 G/DL (ref 13–17.7)
IMM GRANULOCYTES # BLD AUTO: 0.04 10*3/MM3 (ref 0–0.05)
IMM GRANULOCYTES NFR BLD AUTO: 0.5 % (ref 0–0.5)
LYMPHOCYTES # BLD AUTO: 2.85 10*3/MM3 (ref 0.7–3.1)
LYMPHOCYTES NFR BLD AUTO: 33 % (ref 19.6–45.3)
MCH RBC QN AUTO: 30.1 PG (ref 26.6–33)
MCHC RBC AUTO-ENTMCNC: 33.6 G/DL (ref 31.5–35.7)
MCV RBC AUTO: 89.5 FL (ref 79–97)
MONOCYTES # BLD AUTO: 1.08 10*3/MM3 (ref 0.1–0.9)
MONOCYTES NFR BLD AUTO: 12.5 % (ref 5–12)
NEUTROPHILS NFR BLD AUTO: 4.43 10*3/MM3 (ref 1.7–7)
NEUTROPHILS NFR BLD AUTO: 51.3 % (ref 42.7–76)
NRBC BLD AUTO-RTO: 0 /100 WBC (ref 0–0.2)
PLATELET # BLD AUTO: 526 10*3/MM3 (ref 140–450)
PMV BLD AUTO: 10.4 FL (ref 6–12)
POTASSIUM SERPL-SCNC: 4 MMOL/L (ref 3.5–5.2)
PROT SERPL-MCNC: 8.3 G/DL (ref 6–8.5)
RBC # BLD AUTO: 4.78 10*6/MM3 (ref 4.14–5.8)
SODIUM SERPL-SCNC: 138 MMOL/L (ref 136–145)
WBC NRBC COR # BLD AUTO: 8.63 10*3/MM3 (ref 3.4–10.8)

## 2024-01-18 PROCEDURE — 85670 THROMBIN TIME PLASMA: CPT

## 2024-01-18 PROCEDURE — 85305 CLOT INHIBIT PROT S TOTAL: CPT

## 2024-01-18 PROCEDURE — 85613 RUSSELL VIPER VENOM DILUTED: CPT

## 2024-01-18 PROCEDURE — 85302 CLOT INHIBIT PROT C ANTIGEN: CPT

## 2024-01-18 PROCEDURE — 85025 COMPLETE CBC W/AUTO DIFF WBC: CPT

## 2024-01-18 PROCEDURE — 85306 CLOT INHIBIT PROT S FREE: CPT

## 2024-01-18 PROCEDURE — 85705 THROMBOPLASTIN INHIBITION: CPT

## 2024-01-18 PROCEDURE — 81240 F2 GENE: CPT

## 2024-01-18 PROCEDURE — 86147 CARDIOLIPIN ANTIBODY EA IG: CPT

## 2024-01-18 PROCEDURE — 81241 F5 GENE: CPT

## 2024-01-18 PROCEDURE — 36415 COLL VENOUS BLD VENIPUNCTURE: CPT

## 2024-01-18 PROCEDURE — 80053 COMPREHEN METABOLIC PANEL: CPT

## 2024-01-18 PROCEDURE — 85300 ANTITHROMBIN III ACTIVITY: CPT

## 2024-01-18 PROCEDURE — 85732 THROMBOPLASTIN TIME PARTIAL: CPT

## 2024-01-18 PROCEDURE — 99204 OFFICE O/P NEW MOD 45 MIN: CPT | Performed by: INTERNAL MEDICINE

## 2024-01-18 PROCEDURE — 86146 BETA-2 GLYCOPROTEIN ANTIBODY: CPT

## 2024-01-18 RX ORDER — OXYCODONE HYDROCHLORIDE 5 MG/1
5 TABLET ORAL
COMMUNITY
Start: 2024-01-14 | End: 2024-01-21

## 2024-01-18 NOTE — PROGRESS NOTES
New Patient Office Visit      Date: 2024     Patient Name: Paramjit Eng  MRN: [id number]  : 1995  Referring Physician: Self-referral    Chief Complaint: Establish care for pulmonary embolism    History of Present Illness: Paramjit Eng is a pleasant 28 y.o. male with past medical history of seasonal allergies and anxiety who presents today for evaluation of pulmonary embolism. The patient is accompanied by their mother who contributes to the history of their care.  Patient started to have right-sided chest pain with shortness of breath last week.  He was initially seen in urgent care and prescribed an inhaler however his symptoms continued.  He was then seen in the ER where he was noted to have a D-dimer elevated.  He had a CT scan which showed multiple subsegmental pulmonary emboli as well as bilateral hilar lymphadenopathy.  He was started on apixaban with improvement of his shortness of breath as well as right-sided chest pain.  Of note, he did not have any right heart strain although an ECHO was ordered for an outpatient.  Bilateral lower extremity duplexes were within normal limits.  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  Denies any testosterone use.  Denies any recent long car ride/plane ride.  Denies any recent COVID-19 infection or vaccination    Oncology History:    Oncology/Hematology History    No history exists.       Subjective      Review of Systems:     Constitutional: Negative for fevers, chills, or weight loss  Eyes: Negative for blurred vision or discharge         Ear/Nose/Throat: Negative for difficulty swallowing, sore throat, LAD                                                       Respiratory: Negative for cough, SOA, wheezing                                                                                        Cardiovascular: Negative for chest pain or palpitations                                                                   Gastrointestinal: Negative for nausea, vomiting or diarrhea                                                                     Genitourinary: Negative for dysuria or hematuria                                                                                           Musculoskeletal: Negative for any joint pains or muscle aches                                                                        Neurologic: Negative for any weakness, headaches, dizziness                                                                         Hematologic: Negative for any easy bleeding or bruising                                                                                   Psychiatric: Negative for anxiety or depression                             Past Medical History:   Past Medical History:   Diagnosis Date    Allergies     Anxiety     Fainting     Headache     Numbness     Weakness        Past Surgical History:   Past Surgical History:   Procedure Laterality Date    SPLENECTOMY         Family History:   Family History   Problem Relation Age of Onset    Migraines Mother     Diabetes Mother     Hypertension Mother     Epilepsy Maternal Aunt     Stroke Maternal Grandmother     Hypertension Maternal Grandmother     Alcohol abuse Maternal Grandmother     Diabetes Maternal Grandmother     Heart disease Maternal Grandmother     Mental illness Maternal Grandmother     Migraines Maternal Grandfather     Hypertension Maternal Grandfather     Heart disease Maternal Grandfather     Diabetes Maternal Grandfather     Arthritis Maternal Grandfather     Hyperlipidemia Maternal Grandfather     Hypertension Paternal Grandmother     Heart disease Paternal Grandmother     Hypertension Paternal Grandfather     Heart disease Paternal Grandfather     Aneurysm Paternal Great-Grandfather     Stroke Maternal Great-Grandmother     Alzheimer's disease Maternal Great-Grandmother        Social History:   Social History     Socioeconomic History     "Marital status: Single   Tobacco Use    Smoking status: Never    Smokeless tobacco: Never   Vaping Use    Vaping Use: Never used   Substance and Sexual Activity    Alcohol use: Yes     Comment: pt reports occasionally    Drug use: No    Sexual activity: Defer       Medications:     Current Outpatient Medications:     apixaban (ELIQUIS) 5 MG tablet tablet, Take 1 tablet by mouth., Disp: , Rfl:     oxyCODONE (ROXICODONE) 5 MG immediate release tablet, Take 1 tablet by mouth., Disp: , Rfl:     ELDERBERRY PO, Take  by mouth., Disp: , Rfl:     fluticasone (FLONASE) 50 MCG/ACT nasal spray, 2 sprays into the nostril(s) as directed by provider Daily., Disp: , Rfl:     Loratadine 10 MG capsule, Take  by mouth., Disp: , Rfl:     Multiple Vitamins-Minerals (MULTIVITAMIN WITH MINERALS) tablet tablet, Take 1 tablet by mouth Daily., Disp: , Rfl:     venlafaxine XR (Effexor XR) 75 MG 24 hr capsule, Take 1 capsule by mouth Daily., Disp: 90 capsule, Rfl: 1    Allergies:   No Known Allergies    Objective     Physical Exam:  Vital Signs:   Vitals:    01/18/24 1019   BP: 136/79   Pulse: 95   Resp: 12   Temp: 98.4 °F (36.9 °C)   SpO2: 98%   Weight: 116 kg (255 lb)   Height: 188 cm (74\")   PainSc: 0-No pain     Pain Score    01/18/24 1019   PainSc: 0-No pain     ECOG Performance Status: 0 - Asymptomatic    Constitutional: NAD, ECOG 0  Eyes: PERRLA, scleral anicteric  ENT: No LAD, no thyromegaly  Respiratory: CTAB, no wheezing, rales, rhonchi  Cardiovascular: RRR, no murmurs, pulses 2+ bilaterally  Abdomen: soft, NT/ND, no HSM  Musculoskeletal: strength 5/5 bilaterally, no c/c/e  Neurologic: A&O x 3, CN II-XII intact grossly  Psych: mood and affect congruent, no SI or HI    Results Review:   Admission on 01/11/2024, Discharged on 01/11/2024   Component Date Value Ref Range Status    SARS Antigen 01/11/2024 Not Detected  Not Detected, Presumptive Negative Final    Influenza A Antigen BRIGITTE 01/11/2024 Not Detected  Not Detected Final    " Influenza B Antigen BRIGITTE 01/11/2024 Not Detected  Not Detected Final    Internal Control 01/11/2024 Passed  Passed Final    Lot Number 01/11/2024 [id number]   Final    Expiration Date 01/11/2024 11,102,024   Final    Rapid Strep A Screen 01/11/2024 Negative   Final    Internal Control 01/11/2024 Passed   Final    Lot Number 01/11/2024 [id number]   Final    Expiration Date 01/11/2024 1,[id number]   Final       US Venous Doppler Lower Extremity Bilateral (duplex)    Result Date: 1/14/2024  Narrative: US DUP LOWER EXTREMITIES BILATERAL VENOUS CLINICAL HISTORY: acute pulmonary emboli; rule out dvt. COMPARISON: None. TECHNIQUE: Real time grayscale, color, and spectral Doppler imaging of the bilateral common femoral, femoral, and popliteal veins was performed. Visualized portions of the posterior tibial veins were also assessed. FINDINGS: Right lower extremity: Common femoral vein: Patent and compressible Superior femoral vein: Patent and compressible Mid femoral vein: Patent and compressible Inferior femoral vein: Patent and compressible Popliteal vein: Patent and compressible Posterior tibial vein: Patent and compressible, where visualized Left lower extremity: Common femoral vein: Patent and compressible Superior femoral vein: Patent and compressible Mid femoral vein: Patent and compressible Inferior femoral vein: Patent and compressible Popliteal vein: Patent and compressible Posterior tibial vein: Patent and compressible, where visualized    Impression: No evidence of deep venous thrombosis of the bilateral lower extremity veins. Electronically signed by Cirilo Fernandez MD    CT CHEST PULMONARY EMBOLISM W CONTRAST    Result Date: 1/13/2024  Narrative: CT CHEST PULMONARY EMBOLISM W CONTRAST INDICATION: Shortness of breath. Pulmonary embolism suspected. COMPARISON: No prior chest CT. TECHNIQUE:  CT of the chest was performed with intravenous contrast using a pulmonary angiography protocol. Multiplanar MIP reformations were created.  Up-to-date CT equipment and radiation dose reduction techniques were employed. [5 mL of Isovue-370 IV. FINDINGS: Pulmonary arteries: Acute pulmonary emboli within the lobar and segmental arteries of the right upper lobe, right middle lobe and right lower lobe. There are numerous segmental emboli also seen within the left upper lobe, and the left lower lobe. Lungs and airways: Respiratory motion artifacts. Peripheral ground glass opacities and some which likely represent atelectasis the geographic opacity specifically within the right middle lobe are likely pulmonary infarcts. Pleura: No effusion or significant pleural thickening. Mediastinum: Heart size is normal. No pericardial effusion. RV/LV ratio is less than 1. Thoracic aorta is normal in caliber. Lymph nodes: Right hilar mass likely lymphadenopathy measuring 3.1 x 1.8 cm. There are additional enlarged lymph nodes in the right hilum as well as enlarged subcarinal lymph nodes. There are mildly enlarged lymph nodes in the left hilum. Mildly prominent paratracheal lymph nodes. Chest wall/lower neck: No significant findings. Bones: No destructive lesions. Upper abdomen: There are multiple soft tissue nodules in the left upper quadrant otherwise in the absence of the spleen suggestive of splenosis. Example nodule measuring 3.1 x 2.1 cm.    Impression: 1.  Multiple acute bilateral pulmonary emboli. 2.  No CT evidence of right heart strain. 3.  Scattered pulmonary infarcts. 4.  Thoracic lymphadenopathy most prominently in the right hilum but also present in the left hilum and mediastinum. This is nonspecific but more likely than not infectious/inflammatory this age and correlate with clinical findings and follow-up imaging to document resolution or tissue sampling if then warranted. Results discussed with ED physician Dr. Machado at 5:20 AM on 1/13/2024. ----------PERT DATA---------- Data reported only if pulmonary embolism is present: Most central extent of clot: Lobar  RV/LV ratio: Less than 1 ----------PERT DATA---------- Electronically signed by Pepe Noriega MD    XR Chest 2 View    Result Date: 1/13/2024  Narrative: CHEST 2 VIEWS INDICATION: Right lower chest wall pain. COMPARISON: 2/10/2016 FINDINGS: The cardiomediastinal silhouette is within normal limits. No focal consolidation. No pleural effusion or pneumothorax. Bones are unremarkable.    Impression: No acute cardiopulmonary findings. Electronically signed by Pepe Noriega MD     Assessment / Plan      Assessment/Plan:   1. Multiple subsegmental pulmonary emboli without acute cor pulmonale (Primary)  -Likely an unprovoked event in January 2023  -Currently on apixaban and tolerating well  -Will plan for at least 3 months of anticoagulation therapy  -Will check hypercoagulable workup as below  -Given his bilateral hilar adenopathy, will repeat CT scan in 1 month and if he still has lymphadenopathy, will consider biopsy at that time  -     CBC & Differential; Future  -     Comprehensive Metabolic Panel; Future  -     Anticardiolipin Antibody, IgG / M, Qn; Future  -     Antithrombin III; Future  -     Beta-2 Glycoprotein Antibodies; Future  -     Factor 5 Leiden; Future  -     Lupus Anticoagulant; Future  -     Protein C & S Antigens; Future  -     Protein S Functional; Future  -     Factor II, DNA Analysis; Future  -     CT Angiogram Chest Pulmonary Embolism; Future           Follow Up:   Follow-up in 1 month     Marcell Weinberg MD  Hematology and Oncology     Please note that portions of this note may have been completed with a voice recognition program. Efforts were made to edit the dictations, but occasionally words are mistranscribed.

## 2024-01-18 NOTE — TELEPHONE ENCOUNTER
Caller: Paramjit Eng    Relationship: Self    Best call back number: [phone]     What is the best time to reach you: ASAP    Who are you requesting to speak with (clinical staff, provider,  specific staff member): PAUL/ANGEL/EDUARDO    What was the call regarding: PT IS RETURNING CALL FROM PAUL, SAYS SHE WAS ASKING TO MOVE HIS APPT UP TODAY TO EARLIER TIME BUT DIDN'T SAY WHEN.  THE PATIENT SAYS HE CAN PROBABLY DO THIS, BUT PLEASE CALL PT BACK TO LET HIM KNOW THE TIME.  HUB UNABLE TO WARM TRANSFER.

## 2024-01-18 NOTE — TELEPHONE ENCOUNTER
Call to see if able to move appointment to earlier time today related to weather.  Left message to return call

## 2024-01-19 ENCOUNTER — TRANSCRIBE ORDERS (OUTPATIENT)
Dept: ADMINISTRATIVE | Facility: HOSPITAL | Age: 29
End: 2024-01-19
Payer: COMMERCIAL

## 2024-01-19 ENCOUNTER — TELEMEDICINE (OUTPATIENT)
Dept: PRIMARY CARE CLINIC | Age: 29
End: 2024-01-19

## 2024-01-19 DIAGNOSIS — J01.90 ACUTE NON-RECURRENT SINUSITIS, UNSPECIFIED LOCATION: ICD-10-CM

## 2024-01-19 DIAGNOSIS — R00.0 TACHYCARDIA, UNSPECIFIED: Primary | ICD-10-CM

## 2024-01-19 DIAGNOSIS — R00.0 TACHYCARDIA: ICD-10-CM

## 2024-01-19 DIAGNOSIS — I26.94 MULTIPLE SUBSEGMENTAL PULMONARY EMBOLI WITHOUT ACUTE COR PULMONALE: ICD-10-CM

## 2024-01-19 DIAGNOSIS — I26.94 MULTIPLE SUBSEGMENTAL PULMONARY EMBOLI WITHOUT ACUTE COR PULMONALE (HCC): Primary | ICD-10-CM

## 2024-01-19 DIAGNOSIS — Z09 HOSPITAL DISCHARGE FOLLOW-UP: ICD-10-CM

## 2024-01-19 LAB
CARDIOLIPIN IGG SER IA-ACNC: 12 GPL U/ML (ref 0–14)
CARDIOLIPIN IGM SER IA-ACNC: <9 MPL U/ML (ref 0–12)
F5 GENE MUT ANL BLD/T: NORMAL
FACTOR II, DNA ANALYSIS: NORMAL

## 2024-01-19 RX ORDER — AMOXICILLIN 875 MG/1
875 TABLET, COATED ORAL 2 TIMES DAILY
Qty: 20 TABLET | Refills: 0 | Status: SHIPPED | OUTPATIENT
Start: 2024-01-19 | End: 2024-01-29

## 2024-01-19 NOTE — PROGRESS NOTES
Post-Discharge Transitional Care Follow Up      Shakir Fernandez   YOB: 1995    Date of Office Visit:  1/19/2024  Date of Hospital Admission: 1/13/24  Date of Hospital Discharge: 1/14/24  Readmission Risk Score (high >=14%. Medium >=10%):No data recorded    Care management risk score Rising risk (score 2-5) and Complex Care (Scores >=6): No Risk Score On File     Non face to face  following discharge, date last encounter closed (first attempt may have been earlier): 01/16/2024     Call initiated 2 business days of discharge: Yes     Multiple subsegmental pulmonary emboli without acute cor pulmonale (HCC)  -     Echocardiogram complete; Future  Acute non-recurrent sinusitis, unspecified location  -     amoxicillin (AMOXIL) 875 MG tablet; Take 1 tablet by mouth 2 times daily for 10 days, Disp-20 tablet, R-0Normal  Tachycardia  -     Echocardiogram complete; Future  Hospital discharge follow-up      Medical Decision Making: high complexity  No follow-ups on file.    On this date 1/19/2024 I have spent    minutes reviewing previous notes, test results and face to face with the patient discussing the diagnosis and importance of compliance with the treatment plan as well as documenting on the day of the visit.       Subjective:   HPI  He was recently hospitalized for multiple segment pulmonary clots. She is on eliquis.  He has had cough and chest wall pain.  He currently has some sinus pressure and congestion.  . He recently went back to work.  He is still slightly short of breath especially with conversation.  He isn't doing anything strenuous.    The pain is currently gone.  He has been sleeping sitting up   He does have a pulse ox and has been monitoring 95 and above.  He says yesterday he noticed hsi heart rate has come down.     Inpatient course: Discharge summary reviewed- see chart.    Interval history/Current status: good    Patient Active Problem List   Diagnosis    Allergic rhinitis    Hyperactive

## 2024-01-20 LAB
APTT SCREEN TO CONFIRM RATIO: 1.04 RATIO (ref 0–1.34)
AT III ACT/NOR PPP CHRO: 176 % (ref 75–135)
CONFIRM APTT/NORMAL: 51.8 SEC (ref 0–47.6)
DRVVT SCREEN TO CONFIRM RATIO: 1.3 RATIO (ref 0.8–1.2)
LA 2 SCREEN W REFLEX-IMP: ABNORMAL
MIXING DRVVT: 70.8 SEC (ref 0–40.4)
PROT S ACT/NOR PPP: 96 % (ref 63–140)
SCREEN APTT: 41.2 SEC (ref 0–43.5)
SCREEN DRVVT: 120 SEC (ref 0–47)
THROMBIN TIME: 15.4 SEC (ref 0–23)

## 2024-01-21 LAB
B2 GLYCOPROT1 IGA SER-ACNC: <9 GPI IGA UNITS (ref 0–25)
B2 GLYCOPROT1 IGG SER-ACNC: <9 GPI IGG UNITS (ref 0–20)
B2 GLYCOPROT1 IGM SER-ACNC: 9 GPI IGM UNITS (ref 0–32)
PROT C AG ACT/NOR PPP IA: 107 % (ref 60–150)
PROT S AG ACT/NOR PPP IA: 141 % (ref 60–150)
PROT S FREE AG ACT/NOR PPP IA: 117 % (ref 61–136)

## 2024-01-29 ASSESSMENT — ENCOUNTER SYMPTOMS
GASTROINTESTINAL NEGATIVE: 1
RESPIRATORY NEGATIVE: 1
EYES NEGATIVE: 1

## 2024-02-07 ENCOUNTER — TELEPHONE (OUTPATIENT)
Dept: PRIMARY CARE CLINIC | Age: 29
End: 2024-02-07

## 2024-02-07 NOTE — TELEPHONE ENCOUNTER
Meet called said he was put on Eliquis in ER/Hosp ., when he called to get a refill pharmacy said insurance wouldn't allow him to fill for several days and he will be out before that time, We don't have samples we have called and the rep said he would bring us some when he got samples...  Wants to know what he should do... please return his phone call

## 2024-02-08 ENCOUNTER — TELEPHONE (OUTPATIENT)
Dept: PRIMARY CARE CLINIC | Age: 29
End: 2024-02-08

## 2024-02-08 ENCOUNTER — HOSPITAL ENCOUNTER (OUTPATIENT)
Dept: CARDIOLOGY | Facility: HOSPITAL | Age: 29
Discharge: HOME | End: 2024-02-08
Admitting: NURSE PRACTITIONER
Payer: COMMERCIAL

## 2024-02-08 VITALS
BODY MASS INDEX: 32.82 KG/M2 | SYSTOLIC BLOOD PRESSURE: 146 MMHG | HEIGHT: 74 IN | WEIGHT: 255.73 LBS | DIASTOLIC BLOOD PRESSURE: 90 MMHG

## 2024-02-08 DIAGNOSIS — I26.94 MULTIPLE SUBSEGMENTAL PULMONARY EMBOLI WITHOUT ACUTE COR PULMONALE: ICD-10-CM

## 2024-02-08 DIAGNOSIS — R00.0 TACHYCARDIA, UNSPECIFIED: ICD-10-CM

## 2024-02-08 PROCEDURE — 93306 TTE W/DOPPLER COMPLETE: CPT

## 2024-02-08 NOTE — TELEPHONE ENCOUNTER
Called pharmacy to see what the issue was and the pharmacist said he could  tomorrow the insurance wouldn't pay for it until tomorrow , I did call patient and informed him he marivel Blackburn from [facility] said she would get him an emergency rx for today's dose so he wouldn't have to miss a dose. Pt was informed and breanna.

## 2024-02-09 LAB
BH CV ECHO MEAS - AO MAX PG: 7.3 MMHG
BH CV ECHO MEAS - AO MEAN PG: 4 MMHG
BH CV ECHO MEAS - AO ROOT DIAM: 2.35 CM
BH CV ECHO MEAS - AO V2 MAX: 135 CM/SEC
BH CV ECHO MEAS - AO V2 VTI: 25 CM
BH CV ECHO MEAS - AVA(I,D): 2.23 CM2
BH CV ECHO MEAS - EDV(CUBED): 123.5 ML
BH CV ECHO MEAS - EDV(MOD-SP2): 112 ML
BH CV ECHO MEAS - EDV(MOD-SP4): 100 ML
BH CV ECHO MEAS - EF(MOD-BP): 57.1 %
BH CV ECHO MEAS - EF(MOD-SP2): 61.6 %
BH CV ECHO MEAS - EF(MOD-SP4): 51 %
BH CV ECHO MEAS - EF_3D-VOL: 54 %
BH CV ECHO MEAS - ESV(CUBED): 47.8 ML
BH CV ECHO MEAS - ESV(MOD-SP2): 43 ML
BH CV ECHO MEAS - ESV(MOD-SP4): 49 ML
BH CV ECHO MEAS - FS: 27.1 %
BH CV ECHO MEAS - IVS/LVPW: 0.82 CM
BH CV ECHO MEAS - IVSD: 0.72 CM
BH CV ECHO MEAS - LA DIMENSION: 3.7 CM
BH CV ECHO MEAS - LAT PEAK E' VEL: 21.2 CM/SEC
BH CV ECHO MEAS - LV DIASTOLIC VOL/BSA (35-75): 41.5 CM2
BH CV ECHO MEAS - LV MASS(C)D: 134.9 GRAMS
BH CV ECHO MEAS - LV MAX PG: 5.4 MMHG
BH CV ECHO MEAS - LV MEAN PG: 3 MMHG
BH CV ECHO MEAS - LV SYSTOLIC VOL/BSA (12-30): 20.3 CM2
BH CV ECHO MEAS - LV V1 MAX: 116 CM/SEC
BH CV ECHO MEAS - LV V1 VTI: 20.7 CM
BH CV ECHO MEAS - LVIDD: 5 CM
BH CV ECHO MEAS - LVIDS: 3.6 CM
BH CV ECHO MEAS - LVOT AREA: 2.7 CM2
BH CV ECHO MEAS - LVOT DIAM: 1.85 CM
BH CV ECHO MEAS - LVPWD: 0.88 CM
BH CV ECHO MEAS - MED PEAK E' VEL: 12.1 CM/SEC
BH CV ECHO MEAS - MV A MAX VEL: 64.3 CM/SEC
BH CV ECHO MEAS - MV DEC SLOPE: 834 CM/SEC2
BH CV ECHO MEAS - MV DEC TIME: 0.12 SEC
BH CV ECHO MEAS - MV E MAX VEL: 103 CM/SEC
BH CV ECHO MEAS - MV E/A: 1.6
BH CV ECHO MEAS - MV MAX PG: 3.9 MMHG
BH CV ECHO MEAS - MV MEAN PG: 2 MMHG
BH CV ECHO MEAS - MV V2 VTI: 21.9 CM
BH CV ECHO MEAS - MVA(VTI): 2.5 CM2
BH CV ECHO MEAS - PA ACC TIME: 0.16 SEC
BH CV ECHO MEAS - PA V2 MAX: 112 CM/SEC
BH CV ECHO MEAS - RAP SYSTOLE: 3 MMHG
BH CV ECHO MEAS - RV MAX PG: 2.7 MMHG
BH CV ECHO MEAS - RV V1 MAX: 82.2 CM/SEC
BH CV ECHO MEAS - RV V1 VTI: 15.4 CM
BH CV ECHO MEAS - SI(MOD-SP2): 28.6 ML/M2
BH CV ECHO MEAS - SI(MOD-SP4): 21.2 ML/M2
BH CV ECHO MEAS - SV(LVOT): 55.6 ML
BH CV ECHO MEAS - SV(MOD-SP2): 69 ML
BH CV ECHO MEAS - SV(MOD-SP4): 51 ML
BH CV ECHO MEAS - TAPSE (>1.6): 3.6 CM
BH CV ECHO MEASUREMENTS AVERAGE E/E' RATIO: 6.19
BH CV XLRA - RV BASE: 3.2 CM
BH CV XLRA - TDI S': 17.4 CM/SEC
LEFT ATRIUM VOLUME INDEX: 19.9 ML/M2

## 2024-02-13 ENCOUNTER — TELEPHONE (OUTPATIENT)
Dept: ONCOLOGY | Facility: CLINIC | Age: 29
End: 2024-02-13
Payer: COMMERCIAL

## 2024-02-19 ENCOUNTER — HOSPITAL ENCOUNTER (OUTPATIENT)
Dept: CT IMAGING | Facility: HOSPITAL | Age: 29
Discharge: HOME | End: 2024-02-19
Admitting: INTERNAL MEDICINE
Payer: COMMERCIAL

## 2024-02-19 DIAGNOSIS — I26.94 MULTIPLE SUBSEGMENTAL PULMONARY EMBOLI WITHOUT ACUTE COR PULMONALE: ICD-10-CM

## 2024-02-19 PROCEDURE — 25510000001 IOPAMIDOL 61 % SOLUTION: Performed by: INTERNAL MEDICINE

## 2024-02-19 PROCEDURE — 71275 CT ANGIOGRAPHY CHEST: CPT

## 2024-02-19 RX ADMIN — IOPAMIDOL 100 ML: 612 INJECTION, SOLUTION INTRAVENOUS at 09:07

## 2024-02-20 ENCOUNTER — CARE COORDINATION (OUTPATIENT)
Dept: PRIMARY CARE CLINIC | Age: 29
End: 2024-02-20

## 2024-02-20 NOTE — CARE COORDINATION
Scar Hdz, RN  Manager Care Coordination  [phone]     I placed a final HFU call to Shakir.  He tells me he did get his medication issue resolved and has eliquis.  We talked about his recent test results being good.  He tells me he feels back to his baseline now.  He does have a follow up this week with hematology.  No other needs at this time.

## 2024-02-22 ENCOUNTER — LAB (OUTPATIENT)
Dept: LAB | Facility: HOSPITAL | Age: 29
End: 2024-02-22
Payer: COMMERCIAL

## 2024-02-22 ENCOUNTER — OFFICE VISIT (OUTPATIENT)
Dept: ONCOLOGY | Facility: CLINIC | Age: 29
End: 2024-02-22
Payer: COMMERCIAL

## 2024-02-22 VITALS
SYSTOLIC BLOOD PRESSURE: 134 MMHG | RESPIRATION RATE: 12 BRPM | DIASTOLIC BLOOD PRESSURE: 76 MMHG | OXYGEN SATURATION: 99 % | TEMPERATURE: 99.3 F | HEIGHT: 74 IN | WEIGHT: 252 LBS | BODY MASS INDEX: 32.34 KG/M2 | HEART RATE: 76 BPM

## 2024-02-22 DIAGNOSIS — R59.1 LYMPHADENOPATHY: ICD-10-CM

## 2024-02-22 DIAGNOSIS — I26.94 MULTIPLE SUBSEGMENTAL PULMONARY EMBOLI WITHOUT ACUTE COR PULMONALE: Primary | ICD-10-CM

## 2024-02-22 LAB
BASOPHILS # BLD AUTO: 0.04 10*3/MM3 (ref 0–0.2)
BASOPHILS NFR BLD AUTO: 0.8 % (ref 0–1.5)
DEPRECATED RDW RBC AUTO: 43.1 FL (ref 37–54)
EOSINOPHIL # BLD AUTO: 0.05 10*3/MM3 (ref 0–0.4)
EOSINOPHIL NFR BLD AUTO: 1 % (ref 0.3–6.2)
ERYTHROCYTE [DISTWIDTH] IN BLOOD BY AUTOMATED COUNT: 13.3 % (ref 12.3–15.4)
HCT VFR BLD AUTO: 46.4 % (ref 37.5–51)
HGB BLD-MCNC: 15.1 G/DL (ref 13–17.7)
IMM GRANULOCYTES # BLD AUTO: 0.01 10*3/MM3 (ref 0–0.05)
IMM GRANULOCYTES NFR BLD AUTO: 0.2 % (ref 0–0.5)
LYMPHOCYTES # BLD AUTO: 2.01 10*3/MM3 (ref 0.7–3.1)
LYMPHOCYTES NFR BLD AUTO: 39.8 % (ref 19.6–45.3)
MCH RBC QN AUTO: 29 PG (ref 26.6–33)
MCHC RBC AUTO-ENTMCNC: 32.5 G/DL (ref 31.5–35.7)
MCV RBC AUTO: 89.2 FL (ref 79–97)
MONOCYTES # BLD AUTO: 0.81 10*3/MM3 (ref 0.1–0.9)
MONOCYTES NFR BLD AUTO: 16 % (ref 5–12)
NEUTROPHILS NFR BLD AUTO: 2.13 10*3/MM3 (ref 1.7–7)
NEUTROPHILS NFR BLD AUTO: 42.2 % (ref 42.7–76)
NRBC BLD AUTO-RTO: 0 /100 WBC (ref 0–0.2)
PLATELET # BLD AUTO: 355 10*3/MM3 (ref 140–450)
PMV BLD AUTO: 11.2 FL (ref 6–12)
RBC # BLD AUTO: 5.2 10*6/MM3 (ref 4.14–5.8)
WBC NRBC COR # BLD AUTO: 5.05 10*3/MM3 (ref 3.4–10.8)

## 2024-02-22 PROCEDURE — 36415 COLL VENOUS BLD VENIPUNCTURE: CPT

## 2024-02-22 PROCEDURE — 99214 OFFICE O/P EST MOD 30 MIN: CPT | Performed by: INTERNAL MEDICINE

## 2024-02-22 PROCEDURE — 85025 COMPLETE CBC W/AUTO DIFF WBC: CPT

## 2024-02-22 NOTE — PROGRESS NOTES
Follow Up Office Visit      Date: 2024     Patient Name: Paramjit Eng  MRN: [id number]  : 1995  Referring Physician: Self-referral     Chief Complaint: Follow-up for pulmonary embolism     History of Present Illness: Paramjit Eng is a pleasant 28 y.o. male with past medical history of seasonal allergies and anxiety who presents today for evaluation of pulmonary embolism. The patient is accompanied by their mother who contributes to the history of their care.  Patient started to have right-sided chest pain with shortness of breath last week.  He was initially seen in urgent care and prescribed an inhaler however his symptoms continued.  He was then seen in the ER where he was noted to have a D-dimer elevated.  He had a CT scan which showed multiple subsegmental pulmonary emboli as well as bilateral hilar lymphadenopathy.  He was started on apixaban with improvement of his shortness of breath as well as right-sided chest pain.  Of note, he did not have any right heart strain although an ECHO was ordered for an outpatient.  Bilateral lower extremity duplexes were within normal limits.  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  Denies any testosterone use.  Denies any recent long car ride/plane ride.  Denies any recent COVID-19 infection or vaccination    Interval History:  Presents clinic for follow-up.  Continues on apixaban and tolerating well.  Denies any easy bleeding or bruising episodes.  Denies any chest pain or shortness of breath.  Feels like his activity level is back to normal    Oncology History:    Oncology/Hematology History    No history exists.       Subjective      Review of Systems:   Constitutional: Negative for fevers, chills, or weight loss  Eyes: Negative for blurred vision or discharge         Ear/Nose/Throat: Negative for difficulty swallowing, sore throat, LAD                                                       Respiratory:  Negative for cough, SOA, wheezing                                                                                        Cardiovascular: Negative for chest pain or palpitations                                                                  Gastrointestinal: Negative for nausea, vomiting or diarrhea                                                                     Genitourinary: Negative for dysuria or hematuria                                                                                           Musculoskeletal: Negative for any joint pains or muscle aches                                                                        Neurologic: Negative for any weakness, headaches, dizziness                                                                         Hematologic: Negative for any easy bleeding or bruising                                                                                   Psychiatric: Negative for anxiety or depression                          Past Medical History/Past Surgical History/ Family History/ Social History: Reviewed by me and unchanged from my previous documentation done on January 2024.     Medications:     Current Outpatient Medications:     apixaban (ELIQUIS) 5 MG tablet tablet, Take 1 tablet by mouth., Disp: , Rfl:     ELDERBERRY PO, Take  by mouth., Disp: , Rfl:     fluticasone (FLONASE) 50 MCG/ACT nasal spray, 2 sprays into the nostril(s) as directed by provider Daily., Disp: , Rfl:     Loratadine 10 MG capsule, Take  by mouth., Disp: , Rfl:     Multiple Vitamins-Minerals (MULTIVITAMIN WITH MINERALS) tablet tablet, Take 1 tablet by mouth Daily., Disp: , Rfl:     venlafaxine XR (Effexor XR) 75 MG 24 hr capsule, Take 1 capsule by mouth Daily., Disp: 90 capsule, Rfl: 1    Allergies:   No Known Allergies    Objective     Physical Exam:  Vital Signs:   Vitals:    02/22/24 0911   BP: 134/76   Pulse: 76   Resp: 12   Temp: 99.3 °F (37.4 °C)   SpO2: 99%   Weight: 114 kg (252 lb)  "  Height: 188 cm (74\")   PainSc: 0-No pain     Pain Score    02/22/24 0911   PainSc: 0-No pain     ECOG Performance Status: 0 - Asymptomatic    Constitutional: NAD, ECOG 0  Eyes: PERRLA, scleral anicteric  ENT: No LAD, no thyromegaly  Respiratory: CTAB, no wheezing, rales, rhonchi  Cardiovascular: RRR, no murmurs, pulses 2+ bilaterally  Abdomen: soft, NT/ND, no HSM  Musculoskeletal: strength 5/5 bilaterally, no c/c/e  Neurologic: A&O x 3, CN II-XII intact grossly    Results Review:   No visits with results within 2 Week(s) from this visit.   Latest known visit with results is:   Hospital Outpatient Visit on 02/08/2024   Component Date Value Ref Range Status    EF(MOD-bp) 02/08/2024 57.1  % Final    EF_3D-VOL 02/08/2024 54.0  % Final    LVIDd 02/08/2024 5.0  cm Final    LVIDs 02/08/2024 3.6  cm Final    IVSd 02/08/2024 0.72  cm Final    LVPWd 02/08/2024 0.88  cm Final    FS 02/08/2024 27.1  % Final    IVS/LVPW 02/08/2024 0.82  cm Final    ESV(cubed) 02/08/2024 47.8  ml Final    LV Sys Vol (BSA corrected) 02/08/2024 20.3  cm2 Final    EDV(cubed) 02/08/2024 123.5  ml Final    LV Moreno Vol (BSA corrected) 02/08/2024 41.5  cm2 Final    LV mass(C)d 02/08/2024 134.9  grams Final    LVOT area 02/08/2024 2.7  cm2 Final    LVOT diam 02/08/2024 1.85  cm Final    EDV(MOD-sp2) 02/08/2024 112.0  ml Final    EDV(MOD-sp4) 02/08/2024 100.0  ml Final    ESV(MOD-sp2) 02/08/2024 43.0  ml Final    ESV(MOD-sp4) 02/08/2024 49.0  ml Final    SV(MOD-sp2) 02/08/2024 69.0  ml Final    SV(MOD-sp4) 02/08/2024 51.0  ml Final    SI(MOD-sp2) 02/08/2024 28.6  ml/m2 Final    SI(MOD-sp4) 02/08/2024 21.2  ml/m2 Final    EF(MOD-sp2) 02/08/2024 61.6  % Final    EF(MOD-sp4) 02/08/2024 51.0  % Final    MV E max mikal 02/08/2024 103.0  cm/sec Final    MV A max mikal 02/08/2024 64.3  cm/sec Final    MV dec time 02/08/2024 0.12  sec Final    MV E/A 02/08/2024 1.60   Final    LA ESV Index (BP) 02/08/2024 19.9  ml/m2 Final    Med Peak E' Mikal 02/08/2024 12.1  cm/sec " Final    Lat Peak E' Mikal 02/08/2024 21.2  cm/sec Final    Avg E/e' ratio 02/08/2024 6.19   Final    SV(LVOT) 02/08/2024 55.6  ml Final    RV Base 02/08/2024 3.2  cm Final    TAPSE (>1.6) 02/08/2024 3.6  cm Final    RV S' 02/08/2024 17.4  cm/sec Final    LA dimension (2D)  02/08/2024 3.7  cm Final    LV V1 max 02/08/2024 116.0  cm/sec Final    LV V1 max PG 02/08/2024 5.4  mmHg Final    LV V1 mean PG 02/08/2024 3.0  mmHg Final    LV V1 VTI 02/08/2024 20.7  cm Final    Ao pk mikal 02/08/2024 135.0  cm/sec Final    Ao max PG 02/08/2024 7.3  mmHg Final    Ao mean PG 02/08/2024 4.0  mmHg Final    Ao V2 VTI 02/08/2024 25.0  cm Final    TIERA(I,D) 02/08/2024 2.23  cm2 Final    MV max PG 02/08/2024 3.9  mmHg Final    MV mean PG 02/08/2024 2.00  mmHg Final    MV V2 VTI 02/08/2024 21.9  cm Final    MVA(VTI) 02/08/2024 2.5  cm2 Final    MV dec slope 02/08/2024 834.0  cm/sec2 Final    RAP systole 02/08/2024 3.0  mmHg Final    RV V1 max PG 02/08/2024 2.7  mmHg Final    RV V1 max 02/08/2024 82.2  cm/sec Final    RV V1 VTI 02/08/2024 15.4  cm Final    PA V2 max 02/08/2024 112.0  cm/sec Final    PA acc time 02/08/2024 0.16  sec Final    Ao root diam 02/08/2024 2.35  cm Final       CT Angiogram Chest Pulmonary Embolism    Result Date: 2/19/2024  Narrative: CT ANGIOGRAM CHEST PULMONARY EMBOLISM  HISTORY: History of PE and bilateral hilar lymphadenopathy; I26.94-Multiple subsegmental pulmonary emboli without acute cor pulmonale.  TECHNIQUE: Thin section axial CT with IV contrast supplemented with 3D reconstructed MIP images.  COMPARISON: Outside exam 01/13/2024.  FINDINGS:  Pulmonary vessels enhance in a normal fashion without evidence of embolic disease. Previously noted filling defects within the pulmonary arterial system are no longer evident. Thoracic aorta is normal in caliber without evidence of aneurysm or dissection.  Minimal peripheral opacity in the anterior lateral right lung base is slightly more prominent from prior exam. This  may represent pulmonary infarct as a sequela of previous PE. Left lung is clear.  No pleural or pericardial effusion is seen. Mild right paratracheal adenopathy is present. Right suprahilar adenopathy is seen. Lymph node measures 31 x 25 mm, unchanged. Subcarinal adenopathy is noted. Right subcarinal lymph node measures 28 x 20 mm, previously 21 x 15 mm. Right lower lobe peribronchial adenopathy in the right infrahilar region measures 30 x 20 mm, previously 30 x 22 mm. A borderline enlarged prevascular lymph node measuring up to 9 mm is unchanged.      Impression: 1. No evidence of pulmonary embolism. 2. Multifocal adenopathy largely stable with the exception of 1 enlarging lymph node in the right subcarinal region. Continued surveillance recommended in 6 months. 3. Minimal interval enlargement of peripheral opacity right middle lobe could be related to pulmonary infarct.    This study was performed with techniques to keep radiation doses as low as reasonably achievable (ALARA). Individualized dose reduction techniques using automated exposure control or adjustment of vA and/or kV according to the patient size were employed.  This report was signed and finalized on 2/19/2024 2:16 PM by Michael Bonds MD.      Adult Transthoracic Echo Complete W/ Cont if Necessary Per Protocol    Result Date: 2/9/2024  Narrative:   Left ventricular systolic function is normal. Calculated left ventricular EF = 57.1% Left ventricular ejection fraction appears to be 56 - 60%.   Left ventricular diastolic function was normal.   Normal right ventricular size and function.   No hemodynamically significant valvular dysfunction.      Assessment / Plan      Assessment/Plan:   1. Multiple subsegmental pulmonary emboli without acute cor pulmonale (Primary)  -Likely an unprovoked event in January 2024  -Currently on apixaban and tolerating well  -Hypercoagulable workup only notable for a positive lupus anticoagulant which can be falsely positive  in the setting of anticoagulation  -CT chest in February 2024 without evidence of pulmonary embolism  -Will plan to complete at least 3 months of anticoagulation finishing in April 2024  -Will plan to repeat a lupus anticoagulant when he is off blood thinners for about 2-3 weeks    2. Lymphadenopathy  -Still present on CT scan  -Uncertain pathophysiology at this time the patient is currently asymptomatic  -Will check flow cytometry and BCR/ABL today  -Will plan to repeat a CT chest at the beginning of May and consider biopsy at that time  -     CBC & Differential; Future  -     FLOW CYTOMETRY, [facility] - LEUKEMIA/LYMPHOMA IMMUNOPHENOTYPING (OSIEL,COR,RUCHI); Future  -     BCR-ABL1, CML / ALL, PCR, Quant; Future  -     CT Chest With Contrast Diagnostic; Future         Follow Up:   Follow-up in 2-3 months     Marcell Weinberg MD  Hematology and Oncology     Please note that portions of this note may have been completed with a voice recognition program. Efforts were made to edit the dictations, but occasionally words are mistranscribed.

## 2024-02-26 LAB
ASSESSMENT OF LEUKOCYTES: NORMAL
CLINICAL INFO: NORMAL
GATING STRATEGY: NORMAL
IMMUNOPHENOTYPING STUDY: NORMAL
LABORATORY COMMENT REPORT: NORMAL
PATH INTERP SPEC-IMP: NORMAL
PATHOLOGIST NAME: NORMAL
SPECIMEN SOURCE: NORMAL
VIABLE CELLS NFR SPEC: NORMAL %

## 2024-02-28 LAB
INTERPRETATION: NEGATIVE
LAB DIRECTOR NAME PROVIDER: NORMAL
LABORATORY COMMENT REPORT: NORMAL
REF LAB TEST METHOD: NORMAL
T(ABL1,BCR)B2A2/CONTROL BLD/T: NORMAL %
T(ABL1,BCR)B3A2/CONTROL BLD/T: NORMAL %
T(ABL1,BCR)E1A2/CONTROL BLD/T: NORMAL %

## 2024-03-26 ENCOUNTER — TELEPHONE (OUTPATIENT)
Dept: ONCOLOGY | Facility: CLINIC | Age: 29
End: 2024-03-26
Payer: COMMERCIAL

## 2024-03-26 NOTE — TELEPHONE ENCOUNTER
Return call to Paramjit.  Dr Weinberg reviewed previous scans and is okay with Paramjit being off his Eliquis until his repeat scan and follow up.  Paramjit states understood

## 2024-03-26 NOTE — TELEPHONE ENCOUNTER
----- Message from Shyam Santillan sent at 3/26/2024  9:08 AM EDT -----  Regarding: mediccation question  Pt has question about the direction of elequis.  Miscommunication    [phone]

## 2024-05-02 ENCOUNTER — HOSPITAL ENCOUNTER (OUTPATIENT)
Dept: CT IMAGING | Facility: HOSPITAL | Age: 29
Discharge: HOME | End: 2024-05-02
Admitting: INTERNAL MEDICINE
Payer: COMMERCIAL

## 2024-05-02 ENCOUNTER — TELEPHONE (OUTPATIENT)
Dept: ONCOLOGY | Facility: CLINIC | Age: 29
End: 2024-05-02
Payer: COMMERCIAL

## 2024-05-02 DIAGNOSIS — R59.1 LYMPHADENOPATHY: ICD-10-CM

## 2024-05-02 PROCEDURE — 25510000001 IOPAMIDOL 61 % SOLUTION: Performed by: INTERNAL MEDICINE

## 2024-05-02 PROCEDURE — 71260 CT THORAX DX C+: CPT

## 2024-05-02 RX ADMIN — IOPAMIDOL 100 ML: 612 INJECTION, SOLUTION INTRAVENOUS at 07:50

## 2024-05-02 NOTE — TELEPHONE ENCOUNTER
Caller: Paramjit Eng    Relationship: Self    Best call back number: [phone]    What is the best time to reach you: ANYTIME    Who are you requesting to speak with (clinical staff, provider,  specific staff member): CLINICAL     What was the call regarding: PATIENT NEEDS TO KNOW IF HE NEEDS LABS DRAWN, ONLY ONE IN THE CHART.    CALL TO ADVISE WOULD LIKE TO HAVE DONE TODAY.

## 2024-05-08 DIAGNOSIS — R55 SYNCOPE AND COLLAPSE: ICD-10-CM

## 2024-05-08 DIAGNOSIS — F41.9 ANXIETY: ICD-10-CM

## 2024-05-08 RX ORDER — VENLAFAXINE HYDROCHLORIDE 75 MG/1
75 CAPSULE, EXTENDED RELEASE ORAL DAILY
Qty: 90 CAPSULE | Refills: 1 | Status: SHIPPED | OUTPATIENT
Start: 2024-05-08

## 2024-05-09 ENCOUNTER — OFFICE VISIT (OUTPATIENT)
Dept: ONCOLOGY | Facility: CLINIC | Age: 29
End: 2024-05-09
Payer: COMMERCIAL

## 2024-05-09 VITALS
TEMPERATURE: 99.6 F | DIASTOLIC BLOOD PRESSURE: 78 MMHG | SYSTOLIC BLOOD PRESSURE: 140 MMHG | HEIGHT: 74 IN | WEIGHT: 255 LBS | RESPIRATION RATE: 16 BRPM | OXYGEN SATURATION: 100 % | BODY MASS INDEX: 32.73 KG/M2 | HEART RATE: 91 BPM

## 2024-05-09 DIAGNOSIS — I26.94 MULTIPLE SUBSEGMENTAL PULMONARY EMBOLI WITHOUT ACUTE COR PULMONALE: Primary | ICD-10-CM

## 2024-05-09 PROCEDURE — 99214 OFFICE O/P EST MOD 30 MIN: CPT | Performed by: INTERNAL MEDICINE

## 2024-08-06 ENCOUNTER — APPOINTMENT (OUTPATIENT)
Dept: CT IMAGING | Facility: HOSPITAL | Age: 29
End: 2024-08-06
Payer: COMMERCIAL

## 2024-08-06 ENCOUNTER — HOSPITAL ENCOUNTER (EMERGENCY)
Facility: HOSPITAL | Age: 29
Discharge: HOME | End: 2024-08-06
Attending: STUDENT IN AN ORGANIZED HEALTH CARE EDUCATION/TRAINING PROGRAM | Admitting: STUDENT IN AN ORGANIZED HEALTH CARE EDUCATION/TRAINING PROGRAM
Payer: COMMERCIAL

## 2024-08-06 VITALS
RESPIRATION RATE: 18 BRPM | BODY MASS INDEX: 32.88 KG/M2 | HEIGHT: 74 IN | SYSTOLIC BLOOD PRESSURE: 127 MMHG | TEMPERATURE: 98.3 F | DIASTOLIC BLOOD PRESSURE: 87 MMHG | HEART RATE: 93 BPM | OXYGEN SATURATION: 98 % | WEIGHT: 256.2 LBS

## 2024-08-06 DIAGNOSIS — R06.00 DYSPNEA, UNSPECIFIED TYPE: Primary | ICD-10-CM

## 2024-08-06 LAB
ALBUMIN SERPL-MCNC: 4.7 G/DL (ref 3.5–5.2)
ALBUMIN/GLOB SERPL: 1.5 G/DL
ALP SERPL-CCNC: 65 U/L (ref 39–117)
ALT SERPL W P-5'-P-CCNC: 21 U/L (ref 1–41)
ANION GAP SERPL CALCULATED.3IONS-SCNC: 13.4 MMOL/L (ref 5–15)
AST SERPL-CCNC: 20 U/L (ref 1–40)
BASOPHILS # BLD AUTO: 0.05 10*3/MM3 (ref 0–0.2)
BASOPHILS NFR BLD AUTO: 0.7 % (ref 0–1.5)
BILIRUB SERPL-MCNC: 0.4 MG/DL (ref 0–1.2)
BUN SERPL-MCNC: 17 MG/DL (ref 6–20)
BUN/CREAT SERPL: 19.1 (ref 7–25)
CALCIUM SPEC-SCNC: 9.4 MG/DL (ref 8.6–10.5)
CHLORIDE SERPL-SCNC: 101 MMOL/L (ref 98–107)
CO2 SERPL-SCNC: 24.6 MMOL/L (ref 22–29)
CREAT SERPL-MCNC: 0.89 MG/DL (ref 0.76–1.27)
DEPRECATED RDW RBC AUTO: 44 FL (ref 37–54)
EGFRCR SERPLBLD CKD-EPI 2021: 120.5 ML/MIN/1.73
EOSINOPHIL # BLD AUTO: 0.05 10*3/MM3 (ref 0–0.4)
EOSINOPHIL NFR BLD AUTO: 0.7 % (ref 0.3–6.2)
ERYTHROCYTE [DISTWIDTH] IN BLOOD BY AUTOMATED COUNT: 12.9 % (ref 12.3–15.4)
FLUAV SUBTYP SPEC NAA+PROBE: NOT DETECTED
FLUBV RNA ISLT QL NAA+PROBE: NOT DETECTED
GLOBULIN UR ELPH-MCNC: 3.2 GM/DL
GLUCOSE SERPL-MCNC: 101 MG/DL (ref 65–99)
HCT VFR BLD AUTO: 46.8 % (ref 37.5–51)
HGB BLD-MCNC: 15.7 G/DL (ref 13–17.7)
IMM GRANULOCYTES # BLD AUTO: 0.01 10*3/MM3 (ref 0–0.05)
IMM GRANULOCYTES NFR BLD AUTO: 0.1 % (ref 0–0.5)
LYMPHOCYTES # BLD AUTO: 2.8 10*3/MM3 (ref 0.7–3.1)
LYMPHOCYTES NFR BLD AUTO: 36.7 % (ref 19.6–45.3)
MCH RBC QN AUTO: 30.8 PG (ref 26.6–33)
MCHC RBC AUTO-ENTMCNC: 33.5 G/DL (ref 31.5–35.7)
MCV RBC AUTO: 91.8 FL (ref 79–97)
MONOCYTES # BLD AUTO: 0.84 10*3/MM3 (ref 0.1–0.9)
MONOCYTES NFR BLD AUTO: 11 % (ref 5–12)
NEUTROPHILS NFR BLD AUTO: 3.87 10*3/MM3 (ref 1.7–7)
NEUTROPHILS NFR BLD AUTO: 50.8 % (ref 42.7–76)
NRBC BLD AUTO-RTO: 0 /100 WBC (ref 0–0.2)
NT-PROBNP SERPL-MCNC: 51.9 PG/ML (ref 0–450)
PLATELET # BLD AUTO: 328 10*3/MM3 (ref 140–450)
PMV BLD AUTO: 10.6 FL (ref 6–12)
POTASSIUM SERPL-SCNC: 4 MMOL/L (ref 3.5–5.2)
PROT SERPL-MCNC: 7.9 G/DL (ref 6–8.5)
RBC # BLD AUTO: 5.1 10*6/MM3 (ref 4.14–5.8)
SARS-COV-2 RNA RESP QL NAA+PROBE: NOT DETECTED
SODIUM SERPL-SCNC: 139 MMOL/L (ref 136–145)
TROPONIN T SERPL HS-MCNC: <6 NG/L
WBC NRBC COR # BLD AUTO: 7.62 10*3/MM3 (ref 3.4–10.8)

## 2024-08-06 PROCEDURE — 83880 ASSAY OF NATRIURETIC PEPTIDE: CPT | Performed by: STUDENT IN AN ORGANIZED HEALTH CARE EDUCATION/TRAINING PROGRAM

## 2024-08-06 PROCEDURE — 25810000003 SODIUM CHLORIDE 0.9 % SOLUTION: Performed by: NURSE PRACTITIONER

## 2024-08-06 PROCEDURE — 84484 ASSAY OF TROPONIN QUANT: CPT | Performed by: STUDENT IN AN ORGANIZED HEALTH CARE EDUCATION/TRAINING PROGRAM

## 2024-08-06 PROCEDURE — 93005 ELECTROCARDIOGRAM TRACING: CPT | Performed by: STUDENT IN AN ORGANIZED HEALTH CARE EDUCATION/TRAINING PROGRAM

## 2024-08-06 PROCEDURE — 87636 SARSCOV2 & INF A&B AMP PRB: CPT | Performed by: NURSE PRACTITIONER

## 2024-08-06 PROCEDURE — 85025 COMPLETE CBC W/AUTO DIFF WBC: CPT | Performed by: NURSE PRACTITIONER

## 2024-08-06 PROCEDURE — 99285 EMERGENCY DEPT VISIT HI MDM: CPT

## 2024-08-06 PROCEDURE — 25510000001 IOPAMIDOL 61 % SOLUTION: Performed by: STUDENT IN AN ORGANIZED HEALTH CARE EDUCATION/TRAINING PROGRAM

## 2024-08-06 PROCEDURE — 71275 CT ANGIOGRAPHY CHEST: CPT

## 2024-08-06 PROCEDURE — 80053 COMPREHEN METABOLIC PANEL: CPT | Performed by: NURSE PRACTITIONER

## 2024-08-06 RX ORDER — SODIUM CHLORIDE 0.9 % (FLUSH) 0.9 %
10 SYRINGE (ML) INJECTION AS NEEDED
Status: DISCONTINUED | OUTPATIENT
Start: 2024-08-06 | End: 2024-08-06 | Stop reason: HOSPADM

## 2024-08-06 RX ADMIN — IOPAMIDOL 100 ML: 612 INJECTION, SOLUTION INTRAVENOUS at 11:33

## 2024-08-06 RX ADMIN — SODIUM CHLORIDE 1000 ML: 9 INJECTION, SOLUTION INTRAVENOUS at 11:06

## 2024-08-06 NOTE — ED PROVIDER NOTES
Pt Name: Paramjit Eng  MRN: [id number]  : 1995  Date of Encounter: 2024    PCP: Le Olson APRN      Subjective    History of Present Illness:    Chief Complaint: Shortness of breath    History of Present Illness: Paramjit Eng is a 28 y.o. male who presents to the ER complaining of shortness of breath that started 3 hours ago.  Patient has history of pulmonary emboli and is recently been taken off of his anticoagulation.  Pain is described as Dull, Intermittent, and does to radiate   Patient rates pain as a 6 on a ten scale.    Triage Vitals:    ED Triage Vitals [24 1051]   Temp Heart Rate Resp BP SpO2   98.3 °F (36.8 °C) 76 18 142/90 99 %      Temp src Heart Rate Source Patient Position BP Location FiO2 (%)   Oral Monitor Sitting Left arm --       Nurses Notes reviewed and agree, including vitals, allergies, social history and prior medical history.     Patient has no known allergies.    Past Medical History:   Diagnosis Date    Allergies     Anxiety     Fainting     Headache     Numbness     Pulmonary embolism     Weakness        Past Surgical History:   Procedure Laterality Date    SPLENECTOMY         Social History     Socioeconomic History    Marital status: Single   Tobacco Use    Smoking status: Never    Smokeless tobacco: Never   Vaping Use    Vaping status: Never Used   Substance and Sexual Activity    Alcohol use: Yes     Comment: pt reports occasionally    Drug use: No    Sexual activity: Defer       Family History   Problem Relation Age of Onset    Migraines Mother     Diabetes Mother     Hypertension Mother     Epilepsy Maternal Aunt     Stroke Maternal Grandmother     Hypertension Maternal Grandmother     Alcohol abuse Maternal Grandmother     Diabetes Maternal Grandmother     Heart disease Maternal Grandmother     Mental illness Maternal Grandmother     Migraines Maternal Grandfather     Hypertension Maternal Grandfather     Heart disease Maternal Grandfather     Diabetes  Maternal Grandfather     Arthritis Maternal Grandfather     Hyperlipidemia Maternal Grandfather     Hypertension Paternal Grandmother     Heart disease Paternal Grandmother     Hypertension Paternal Grandfather     Heart disease Paternal Grandfather     Aneurysm Paternal Great-Grandfather     Stroke Maternal Great-Grandmother     Alzheimer's disease Maternal Great-Grandmother        REVIEW OF SYSTEMS:     All systems reviewed and not pertinent unless noted.    Review of Systems   Respiratory:  Positive for shortness of breath.    All other systems reviewed and are negative.      Objective    Physical Exam  Vitals and nursing note reviewed.   Constitutional:       Appearance: Normal appearance.   HENT:      Head: Normocephalic and atraumatic.   Eyes:      Extraocular Movements: Extraocular movements intact.      Pupils: Pupils are equal, round, and reactive to light.   Cardiovascular:      Rate and Rhythm: Normal rate and regular rhythm.      Pulses: Normal pulses.      Heart sounds: Normal heart sounds.   Pulmonary:      Effort: Pulmonary effort is normal.      Breath sounds: Normal breath sounds.   Abdominal:      General: Bowel sounds are normal.      Palpations: Abdomen is soft.   Musculoskeletal:         General: Normal range of motion.      Cervical back: Normal range of motion and neck supple.   Skin:     General: Skin is warm and dry.      Capillary Refill: Capillary refill takes less than 2 seconds.   Neurological:      Mental Status: He is alert.      GCS: GCS eye subscore is 4. GCS verbal subscore is 5. GCS motor subscore is 6.      Sensory: Sensation is intact.      Motor: Motor function is intact.   Psychiatric:         Attention and Perception: Attention and perception normal.         Mood and Affect: Mood and affect normal.         Speech: Speech normal.                           Procedures    ED Course:    ECG 12 Lead Dyspnea   Final Result          ED Course as of 08/06/24 1527   Tue Aug 06, 2024    1137 I have reviewed the mid-level provider(s) note and verbally discussed the case/plan of care.  I was available for consultation as needed at all times during the patient's visit in the Emergency Department.  I agree with the clinical impression, plan, and disposition unless otherwise stated in the MDM below.    ATTENDING ATTESTATION  HPI: 28 year old male presents with shortness of breath. PMHx of pulmonary embolism. Recently discontinued anticoagulation.    MDM: I have independently reviewed and interpreted the CTA chest.  My interpretation is negative for saddle pulmonary embolism.     [JS]      ED Course User Index  [JS] Ac Hirsch DO       Orders placed during this visit:    Orders Placed This Encounter   Procedures    COVID PRE-OP / PRE-PROCEDURE SCREENING ORDER (NO ISOLATION) - Swab, Nasopharynx    COVID-19 and FLU A/B PCR, 1 HR TAT - Swab, Nasopharynx    CT Angiogram Chest Pulmonary Embolism    Comprehensive Metabolic Panel    CBC Auto Differential    Single High Sensitivity Troponin T    BNP    ECG 12 Lead Dyspnea    CBC & Differential       LAB Results:    Lab Results (last 24 hours)       Procedure Component Value Units Date/Time    CBC & Differential [[id number]]  (Normal) Collected: 08/06/24 1100    Specimen: Blood Updated: 08/06/24 1108    Narrative:      The following orders were created for panel order CBC & Differential.  Procedure                               Abnormality         Status                     ---------                               -----------         ------                     CBC Auto Differential[[id number]]        Normal              Final result                 Please view results for these tests on the individual orders.    Comprehensive Metabolic Panel [[id number]]  (Abnormal) Collected: 08/06/24 1100    Specimen: Blood Updated: 08/06/24 1127     Glucose 101 mg/dL      BUN 17 mg/dL      Creatinine 0.89 mg/dL      Sodium 139 mmol/L      Potassium 4.0 mmol/L      Chloride  101 mmol/L      CO2 24.6 mmol/L      Calcium 9.4 mg/dL      Total Protein 7.9 g/dL      Albumin 4.7 g/dL      ALT (SGPT) 21 U/L      AST (SGOT) 20 U/L      Alkaline Phosphatase 65 U/L      Total Bilirubin 0.4 mg/dL      Globulin 3.2 gm/dL      A/G Ratio 1.5 g/dL      BUN/Creatinine Ratio 19.1     Anion Gap 13.4 mmol/L      eGFR 120.5 mL/min/1.73     Narrative:      GFR Normal >60  Chronic Kidney Disease <60  Kidney Failure <15      CBC Auto Differential [[id number]]  (Normal) Collected: 08/06/24 1100    Specimen: Blood Updated: 08/06/24 1108     WBC 7.62 10*3/mm3      RBC 5.10 10*6/mm3      Hemoglobin 15.7 g/dL      Hematocrit 46.8 %      MCV 91.8 fL      MCH 30.8 pg      MCHC 33.5 g/dL      RDW 12.9 %      RDW-SD 44.0 fl      MPV 10.6 fL      Platelets 328 10*3/mm3      Neutrophil % 50.8 %      Lymphocyte % 36.7 %      Monocyte % 11.0 %      Eosinophil % 0.7 %      Basophil % 0.7 %      Immature Grans % 0.1 %      Neutrophils, Absolute 3.87 10*3/mm3      Lymphocytes, Absolute 2.80 10*3/mm3      Monocytes, Absolute 0.84 10*3/mm3      Eosinophils, Absolute 0.05 10*3/mm3      Basophils, Absolute 0.05 10*3/mm3      Immature Grans, Absolute 0.01 10*3/mm3      nRBC 0.0 /100 WBC     Single High Sensitivity Troponin T [[id number]]  (Normal) Collected: 08/06/24 1100    Specimen: Blood Updated: 08/06/24 1156     HS Troponin T <6 ng/L     Narrative:      High Sensitive Troponin T Reference Range:  <14.0 ng/L- Negative Female for AMI  <22.0 ng/L- Negative Male for AMI  >=14 - Abnormal Female indicating possible myocardial injury.  >=22 - Abnormal Male indicating possible myocardial injury.   Clinicians would have to utilize clinical acumen, EKG, Troponin, and serial changes to determine if it is an Acute Myocardial Infarction or myocardial injury due to an underlying chronic condition.         BNP [[id number]]  (Normal) Collected: 08/06/24 1100    Specimen: Blood Updated: 08/06/24 1156     proBNP 51.9 pg/mL     Narrative:       This assay is used as an aid in the diagnosis of individuals suspected of having heart failure. It can be used as an aid in the diagnosis of acute decompensated heart failure (ADHF) in patients presenting with signs and symptoms of ADHF to the emergency department (ED). In addition, NT-proBNP of <300 pg/mL indicates ADHF is not likely.    Age Range Result Interpretation  NT-proBNP Concentration (pg/mL:      <50             Positive            >450                   Gray                 300-450                    Negative             <300    50-75           Positive            >900                  Gray                300-900                  Negative            <300      >75             Positive            >1800                  Gray                300-1800                  Negative            <300    COVID PRE-OP / PRE-PROCEDURE SCREENING ORDER (NO ISOLATION) - Swab, Nasopharynx [[id number]]  (Normal) Collected: 08/06/24 1104    Specimen: Swab from Nasopharynx Updated: 08/06/24 1127    Narrative:      The following orders were created for panel order COVID PRE-OP / PRE-PROCEDURE SCREENING ORDER (NO ISOLATION) - Swab, Nasopharynx.  Procedure                               Abnormality         Status                     ---------                               -----------         ------                     COVID-19 and FLU A/B PCR...[[id number]]  Normal              Final result                 Please view results for these tests on the individual orders.    COVID-19 and FLU A/B PCR, 1 HR TAT - Swab, Nasopharynx [[id number]]  (Normal) Collected: 08/06/24 1104    Specimen: Swab from Nasopharynx Updated: 08/06/24 1127     COVID19 Not Detected     Influenza A PCR Not Detected     Influenza B PCR Not Detected    Narrative:      Fact sheet for providers: https://www.fda.gov/media/723544/download    Fact sheet for patients: https://www.fda.gov/media/459202/download    Test performed by PCR.             If labs were ordered, I  have independently reviewed the results and considered them in the diagnosis and treatment plan for the patient    RADIOLOGY    CT Angiogram Chest Pulmonary Embolism    Result Date: 8/6/2024  PROCEDURE: CT ANGIOGRAM CHEST PULMONARY EMBOLISM-  HISTORY: Shortness of breath, previous pulmonary emboli  COMPARISON: May 2, 2024  TECHNIQUE: The patient was injected with  IV contrast. Axial images were obtained through the chest in a CTA/ PE protocol. 3 D Reconstruction images were also performed. This study was performed with techniques to keep radiation doses as low as reasonably achievable, (ALARA). Individualized dose reduction techniques using automated exposure control or adjustment of mA and/or kV according to the patient size were employed.  FINDINGS: There is no axillary adenopathy. Subcarinal adenopathy is decreased compared to prior. Right hilar lymph node appears similar to prior. There is intermediate attenuation in the anterior mediastinum most consistent with residual thymic tissue, stable. Prevascular lymph node is stable. The heart is proper size. There is no pericardial or pleural effusion. No filling defects are identified to suggest PE. There is no evidence of thoracic aortic aneurysm or dissection. Limited images of the upper abdomen are unremarkable. No suspicious infiltrate or nodule is identified.      Impression: No evidence of pulmonary embolism.  Stable to decreased medial/hilar adenopathy from prior.   CTDI: 7.02 mGy DLP:279.9 mGy.cm  This report was signed and finalized on 8/6/2024 11:59 AM by Carri Schilling MD.        If I have ordered, I have independently reviewed the above noted radiographic studies.  Please see the radiologist dictation for the official interpretation    Medications given to patient in the ER    Medications   sodium chloride 0.9 % bolus 1,000 mL (0 mL Intravenous Stopped 8/6/24 1226)   iopamidol (ISOVUE-300) 61 % injection 100 mL (100 mL Intravenous Given 8/6/24 1133)        AS OF 15:27 EDT VITALS:    BP - 127/87  HR - 93  TEMP - 98.3 °F (36.8 °C) (Oral)  O2 SATS - 98%         Shared Decision Making: After my consideration of the clinical presentation and laboratory/radiology studies obtained, I have discussed the findings with the patient/patient representative who is in agreement with the treatment plan and final disposition. Risks and benefits of discharge and/or observation admission were discussed.  Final disposition of the patient will be discharged home.  Patient is requested to follow-up with primary care provider and specialist in 1 week following final discharge.    Discharge Medications Prescribed:  No new home medications were prescribed during this ER visit    Medical Decision Making  Paramjit Eng is a 28 y.o. male who presents to the ER complaining of shortness of breath that started 3 hours ago.  Patient has history of pulmonary emboli and is recently been taken off of his anticoagulation.  Pain is described as Dull, Intermittent, and does to radiate   Patient rates pain as a 6 on a ten scale.    DDX: includes but is not limited to: Pneumonia, viral respiratory illness, COVID-19, pulmonary emboli, other    Problems Addressed:  Dyspnea, unspecified type: complicated acute illness or injury    Amount and/or Complexity of Data Reviewed  Labs: ordered. Decision-making details documented in ED Course.     Details: I have personally reviewed and documented all results  Radiology: ordered. Decision-making details documented in ED Course.     Details: I have personally reviewed and documented all results  ECG/medicine tests: ordered. Decision-making details documented in ED Course.     Details: I have personally reviewed and documented all results  Discussion of management or test interpretation with external provider(s): Discussed assessment, treatment and plan with ER attending    Risk  Prescription drug management.  Risk Details: I have discussed with patient the finding  of the test preformed today. Patient has been diagnosed with dyspnea and will be discharged home.  Patient requested to follow-up with primary care provider within the next 7 days for reevaluation. Strict return precautions have been given and patient verbalizes understanding        Final diagnoses:   Dyspnea, unspecified type       Please note that portions of this document were completed using voice recognition dictation software.       Kyle Davenport, APRN  08/06/24 1522

## 2024-11-24 DIAGNOSIS — F41.9 ANXIETY: ICD-10-CM

## 2024-11-24 DIAGNOSIS — R55 SYNCOPE AND COLLAPSE: ICD-10-CM

## 2024-11-25 RX ORDER — VENLAFAXINE HYDROCHLORIDE 75 MG/1
75 CAPSULE, EXTENDED RELEASE ORAL DAILY
Qty: 90 CAPSULE | Refills: 1 | Status: SHIPPED | OUTPATIENT
Start: 2024-11-25

## 2025-06-04 DIAGNOSIS — F41.9 ANXIETY: ICD-10-CM

## 2025-06-04 DIAGNOSIS — R55 SYNCOPE AND COLLAPSE: ICD-10-CM

## 2025-06-06 RX ORDER — VENLAFAXINE HYDROCHLORIDE 75 MG/1
75 CAPSULE, EXTENDED RELEASE ORAL DAILY
Qty: 90 CAPSULE | Refills: 1 | OUTPATIENT
Start: 2025-06-06

## 2025-06-07 DIAGNOSIS — R55 SYNCOPE AND COLLAPSE: ICD-10-CM

## 2025-06-07 DIAGNOSIS — F41.9 ANXIETY: ICD-10-CM

## 2025-06-09 RX ORDER — VENLAFAXINE HYDROCHLORIDE 75 MG/1
75 CAPSULE, EXTENDED RELEASE ORAL DAILY
Qty: 90 CAPSULE | Refills: 1 | OUTPATIENT
Start: 2025-06-09

## 2025-06-13 RX ORDER — METHYLPREDNISOLONE 4 MG/1
TABLET ORAL
Qty: 21 TABLET | Refills: 1 | Status: SHIPPED | OUTPATIENT
Start: 2025-06-13

## 2025-07-15 ENCOUNTER — OFFICE VISIT (OUTPATIENT)
Age: 30
End: 2025-07-15
Payer: COMMERCIAL

## 2025-07-15 ENCOUNTER — HOSPITAL ENCOUNTER (OUTPATIENT)
Facility: HOSPITAL | Age: 30
Discharge: HOME | End: 2025-07-15
Payer: COMMERCIAL

## 2025-07-15 ENCOUNTER — HOSPITAL ENCOUNTER (OUTPATIENT)
Dept: GENERAL RADIOLOGY | Facility: HOSPITAL | Age: 30
Discharge: HOME | End: 2025-07-15
Payer: COMMERCIAL

## 2025-07-15 VITALS
RESPIRATION RATE: 16 BRPM | BODY MASS INDEX: 34.42 KG/M2 | TEMPERATURE: 97.8 F | HEIGHT: 74 IN | OXYGEN SATURATION: 98 % | WEIGHT: 268.2 LBS | HEART RATE: 84 BPM | SYSTOLIC BLOOD PRESSURE: 120 MMHG | DIASTOLIC BLOOD PRESSURE: 82 MMHG

## 2025-07-15 DIAGNOSIS — Z86.711 HISTORY OF PULMONARY EMBOLISM: ICD-10-CM

## 2025-07-15 DIAGNOSIS — R07.89 RIGHT-SIDED CHEST WALL PAIN: ICD-10-CM

## 2025-07-15 DIAGNOSIS — R06.02 SHORTNESS OF BREATH: Primary | ICD-10-CM

## 2025-07-15 DIAGNOSIS — Z13.29 THYROID DISORDER SCREEN: ICD-10-CM

## 2025-07-15 DIAGNOSIS — R06.02 SHORTNESS OF BREATH: ICD-10-CM

## 2025-07-15 DIAGNOSIS — R79.89 ELEVATED LIVER FUNCTION TESTS: Primary | ICD-10-CM

## 2025-07-15 LAB
ALBUMIN SERPL-MCNC: 4.6 G/DL (ref 3.4–4.8)
ALBUMIN/GLOB SERPL: 1.5 {RATIO} (ref 0.8–2)
ALP SERPL-CCNC: 64 U/L (ref 25–100)
ALT SERPL-CCNC: 83 U/L (ref 4–36)
ANION GAP SERPL CALCULATED.3IONS-SCNC: 12 MMOL/L (ref 3–16)
AST SERPL-CCNC: 40 U/L (ref 8–33)
BILIRUB SERPL-MCNC: 0.3 MG/DL (ref 0.3–1.2)
BUN SERPL-MCNC: 18 MG/DL (ref 6–20)
CALCIUM SERPL-MCNC: 9.9 MG/DL (ref 8.3–10.6)
CHLORIDE SERPL-SCNC: 104 MMOL/L (ref 98–107)
CO2 SERPL-SCNC: 25 MMOL/L (ref 20–30)
CREAT SERPL-MCNC: 0.9 MG/DL (ref 0.9–1.3)
D DIMER PPP DDU-MCNC: <0.27 UG/ML FEU (ref 0–0.6)
ERYTHROCYTE [DISTWIDTH] IN BLOOD BY AUTOMATED COUNT: 13.4 % (ref 11–16)
GFR SERPLBLD CREATININE-BSD FMLA CKD-EPI: >90 ML/MIN/{1.73_M2}
GLOBULIN SER CALC-MCNC: 3 G/DL
GLUCOSE SERPL-MCNC: 101 MG/DL (ref 74–106)
HCT VFR BLD AUTO: 47.6 % (ref 40–54)
HGB BLD-MCNC: 15.7 G/DL (ref 13–18)
MCH RBC QN AUTO: 30.3 PG (ref 27–32)
MCHC RBC AUTO-ENTMCNC: 33 G/DL (ref 31–35)
MCV RBC AUTO: 91.9 FL (ref 80–100)
PLATELET # BLD AUTO: 367 K/UL (ref 150–400)
PMV BLD AUTO: 10.5 FL (ref 6–10)
POTASSIUM SERPL-SCNC: 4.5 MMOL/L (ref 3.4–5.1)
PROT SERPL-MCNC: 7.6 G/DL (ref 6.4–8.3)
RBC # BLD AUTO: 5.18 M/UL (ref 4.5–6)
SODIUM SERPL-SCNC: 141 MMOL/L (ref 136–145)
TSH SERPL DL<=0.005 MIU/L-ACNC: 1.16 UIU/ML (ref 0.27–4.2)
WBC # BLD AUTO: 6.8 K/UL (ref 4–11)

## 2025-07-15 PROCEDURE — 71046 X-RAY EXAM CHEST 2 VIEWS: CPT

## 2025-07-15 PROCEDURE — 80053 COMPREHEN METABOLIC PANEL: CPT

## 2025-07-15 PROCEDURE — 99214 OFFICE O/P EST MOD 30 MIN: CPT | Performed by: NURSE PRACTITIONER

## 2025-07-15 PROCEDURE — 36415 COLL VENOUS BLD VENIPUNCTURE: CPT

## 2025-07-15 PROCEDURE — 84443 ASSAY THYROID STIM HORMONE: CPT

## 2025-07-15 PROCEDURE — 85027 COMPLETE CBC AUTOMATED: CPT

## 2025-07-15 PROCEDURE — 85379 FIBRIN DEGRADATION QUANT: CPT

## 2025-07-15 RX ORDER — LORATADINE 10 MG/1
10 TABLET, ORALLY DISINTEGRATING ORAL DAILY
COMMUNITY

## 2025-07-15 RX ORDER — FLUTICASONE PROPIONATE 50 MCG
2 SPRAY, SUSPENSION (ML) NASAL DAILY
COMMUNITY

## 2025-07-15 SDOH — ECONOMIC STABILITY: FOOD INSECURITY: WITHIN THE PAST 12 MONTHS, YOU WORRIED THAT YOUR FOOD WOULD RUN OUT BEFORE YOU GOT MONEY TO BUY MORE.: NEVER TRUE

## 2025-07-15 SDOH — ECONOMIC STABILITY: FOOD INSECURITY: WITHIN THE PAST 12 MONTHS, THE FOOD YOU BOUGHT JUST DIDN'T LAST AND YOU DIDN'T HAVE MONEY TO GET MORE.: NEVER TRUE

## 2025-07-15 ASSESSMENT — ENCOUNTER SYMPTOMS
SHORTNESS OF BREATH: 1
ABDOMINAL PAIN: 1
EYES NEGATIVE: 1
ALLERGIC/IMMUNOLOGIC NEGATIVE: 1

## 2025-07-15 ASSESSMENT — PATIENT HEALTH QUESTIONNAIRE - PHQ9
SUM OF ALL RESPONSES TO PHQ QUESTIONS 1-9: 0
SUM OF ALL RESPONSES TO PHQ QUESTIONS 1-9: 0
1. LITTLE INTEREST OR PLEASURE IN DOING THINGS: NOT AT ALL
SUM OF ALL RESPONSES TO PHQ QUESTIONS 1-9: 0
SUM OF ALL RESPONSES TO PHQ QUESTIONS 1-9: 0
2. FEELING DOWN, DEPRESSED OR HOPELESS: NOT AT ALL

## 2025-07-15 NOTE — PROGRESS NOTES
Chief Complaint   Patient presents with    Pulmonary Embolism       Have you seen any other physician or provider since your last visit yes - [facility] last year    Have you had any other diagnostic tests since your last visit? no    Have you changed or stopped any medications since your last visit? no      SUBJECTIVE:    Patient ID:Shakir Fernandez is a 29 y.o. male.    Chief Complaint   Patient presents with    Pulmonary Embolism     HPI:  Patient was taking Eliquis for 6 months for pulmonary embolism. He stopped 1 year ago. He had no symptoms until the end of last week. He started having shortness of breath and pain in his upper right side.   History of Present Illness  The patient presents for right-sided pain, shortness of breath, and weight gain.    He has been experiencing pain in his upper right side, which began over a year ago. The pain subsided over the weekend, but he recalls a previous episode where the pain intensified to the point of causing discomfort during breathing. He continues to work and drive extensively without any recent changes. He reports no recent illnesses or sicknesses, except for his usual allergies. He has not undergone any hematology workup for clots. His legs were examined and found to be normal. A final scan of his lungs showed resolution of the issue, and his pain and shortness of breath improved after starting Eliquis. He was not advised to undergo further anticoagulation workup unless another event occurs. A repeat CT scan showed resolution of lymphadenopathy and overall normal findings. He was never officially diagnosed with COVID-19. He speculates that the current pain could be due to a strained muscle from physical activities such as pulling, tugging, and moving objects. The pain started at the end of last week and completely resolved by Sunday. He does not smoke or vape and reports no exposure to chemicals or environmental factors. He has been off Eliquis for about a year, having

## 2025-07-17 ENCOUNTER — OFFICE VISIT (OUTPATIENT)
Dept: NEUROLOGY | Facility: CLINIC | Age: 30
End: 2025-07-17
Payer: COMMERCIAL

## 2025-07-17 VITALS
OXYGEN SATURATION: 97 % | SYSTOLIC BLOOD PRESSURE: 116 MMHG | DIASTOLIC BLOOD PRESSURE: 82 MMHG | WEIGHT: 269 LBS | HEART RATE: 84 BPM | TEMPERATURE: 98.7 F | HEIGHT: 74 IN | BODY MASS INDEX: 34.52 KG/M2 | RESPIRATION RATE: 14 BRPM

## 2025-07-17 DIAGNOSIS — F41.9 ANXIETY: ICD-10-CM

## 2025-07-17 DIAGNOSIS — R55 SYNCOPE AND COLLAPSE: ICD-10-CM

## 2025-07-17 RX ORDER — VENLAFAXINE HYDROCHLORIDE 75 MG/1
75 CAPSULE, EXTENDED RELEASE ORAL DAILY
Qty: 90 CAPSULE | Refills: 3 | Status: SHIPPED | OUTPATIENT
Start: 2025-07-17

## 2025-07-17 NOTE — PROGRESS NOTES
Follow Up Office Visit      Patient Name: Paramjit Eng  : 1995   MRN: [id number]     Chief Complaint:    Chief Complaint   Patient presents with    Follow-up     Syncope       History of Present Illness: Paramjit Eng is a 29 y.o. male who is here today to follow up with neurology for syncope and collapse and anxiety. He was last seen in clinic 10/23 (Kit).     He has been taking Venlafaxine 75 mg Daily and reports good tolerance and compliance. He has had no syncopal episodes. He feels his anxiety is well controlled on the medication. No SI/HI. No depression. He denies SE or issues at the pharmacy. Sleeping well, but has new 3 month old daughter, who contributes to broken sleep. Supportive spouse.     Recent Imaging:     CT Soft Tissue Neck W  - noncontributory  CT Temporal Bones WO  -noncontributory  MRI Internal Auditory Canal W/ -noncontributory  CDUS  - noncontributory  CT Head  -noncontributory    Subjective      Review of Systems:   Review of Systems   Constitutional: Negative.    HENT: Negative.     Eyes: Negative.    Respiratory: Negative.     Cardiovascular: Negative.    Gastrointestinal: Negative.    Endocrine: Negative.    Genitourinary: Negative.    Musculoskeletal: Negative.    Skin: Negative.    Allergic/Immunologic: Negative.    Hematological: Negative.    Psychiatric/Behavioral: Negative.  Negative for depressed mood. The patient is not nervous/anxious.    All other systems reviewed and are negative.      I have reviewed and the following portions of the patient's history were updated as appropriate: past family history, past medical history, past social history, past surgical history and problem list.    Medications:     Current Outpatient Medications:     ELDERBERRY PO, Take  by mouth., Disp: , Rfl:     fluticasone (FLONASE) 50 MCG/ACT nasal spray, Administer 2 sprays into the nostril(s) as directed by provider Daily., Disp: , Rfl:     Loratadine 10  "MG capsule, Take  by mouth., Disp: , Rfl:     Multiple Vitamins-Minerals (MULTIVITAMIN WITH MINERALS) tablet tablet, Take 1 tablet by mouth Daily., Disp: , Rfl:     venlafaxine XR (Effexor XR) 75 MG 24 hr capsule, Take 1 capsule by mouth Daily., Disp: 90 capsule, Rfl: 3    apixaban (ELIQUIS) 5 MG tablet tablet, Take 1 tablet by mouth. (Patient not taking: Reported on 7/17/2025), Disp: , Rfl:     methylPREDNISolone (MEDROL) 4 MG dose pack, Take as directed on package instructions. (Patient not taking: Reported on 7/17/2025), Disp: 21 tablet, Rfl: 1    Allergies:   No Known Allergies    Objective     Physical Exam:  Vital Signs:   Vitals:    07/17/25 1427   BP: 116/82   BP Location: Left arm   Patient Position: Sitting   Cuff Size: Adult   Pulse: 84   Resp: 14   Temp: 98.7 °F (37.1 °C)   TempSrc: Temporal   SpO2: 97%   Weight: 122 kg (269 lb)   Height: 188 cm (74.02\")   PainSc: 0-No pain     Body mass index is 34.52 kg/m².    Physical Exam  Vitals and nursing note reviewed.   Constitutional:       General: He is not in acute distress.     Appearance: Normal appearance.   HENT:      Head: Normocephalic.      Nose: Nose normal.      Mouth/Throat:      Mouth: Mucous membranes are moist.      Pharynx: Oropharynx is clear.   Eyes:      Extraocular Movements: Extraocular movements intact.      Conjunctiva/sclera: Conjunctivae normal.   Musculoskeletal:      Cervical back: Normal range of motion and neck supple.   Skin:     General: Skin is warm and dry.      Capillary Refill: Capillary refill takes less than 2 seconds.   Neurological:      Mental Status: He is alert and oriented to person, place, and time.   Psychiatric:         Mood and Affect: Mood normal.         Behavior: Behavior normal.         Neurological Exam  Mental Status  Alert. Oriented to person, place, and time.    Cranial Nerves  CN III, IV, VI: Extraocular movements intact bilaterally.       Assessment / Plan      Assessment/Plan:   Diagnoses and all orders " for this visit:    1. Anxiety  -     venlafaxine XR (Effexor XR) 75 MG 24 hr capsule; Take 1 capsule by mouth Daily.  Dispense: 90 capsule; Refill: 3    2. Syncope and collapse  -     venlafaxine XR (Effexor XR) 75 MG 24 hr capsule; Take 1 capsule by mouth Daily.  Dispense: 90 capsule; Refill: 3         Follow Up:   Return in about 1 year (around 7/17/2026), or if symptoms worsen or fail to improve.    Anticipatory Guidance and Safety Reviewed  Patient Education Provided  Mental health promotion strategies reviewed and encouraged: sleep hygiene, positive social support, exercise > 150 min/week.   Continue Venlafaxine to 75 mg Daily; SE reviewed.   Consider sleep study and EEG with continued syncopal issues.  Declined Behavioral health referral       RTC PRN or within 1 year or sooner with issues (PCP may take over script)     Tatiana Pantoja DNP, APRN, FNP-C  [facility]

## 2025-08-26 ENCOUNTER — HOSPITAL ENCOUNTER (EMERGENCY)
Facility: HOSPITAL | Age: 30
Discharge: HOME | End: 2025-08-26
Attending: EMERGENCY MEDICINE
Payer: COMMERCIAL

## 2025-08-26 ENCOUNTER — APPOINTMENT (OUTPATIENT)
Dept: CT IMAGING | Facility: HOSPITAL | Age: 30
End: 2025-08-26
Attending: EMERGENCY MEDICINE
Payer: COMMERCIAL

## 2025-08-26 VITALS
HEART RATE: 79 BPM | SYSTOLIC BLOOD PRESSURE: 157 MMHG | WEIGHT: 265 LBS | TEMPERATURE: 97.5 F | HEIGHT: 74 IN | OXYGEN SATURATION: 95 % | BODY MASS INDEX: 34.01 KG/M2 | DIASTOLIC BLOOD PRESSURE: 90 MMHG | RESPIRATION RATE: 18 BRPM

## 2025-08-26 DIAGNOSIS — N20.1 CALCULUS OF URETEROVESICAL JUNCTION (UVJ): Primary | ICD-10-CM

## 2025-08-26 LAB
ALBUMIN SERPL-MCNC: 4.5 G/DL (ref 3.4–4.8)
ALBUMIN/GLOB SERPL: 1.4 {RATIO} (ref 0.8–2)
ALP SERPL-CCNC: 65 U/L (ref 25–100)
ALT SERPL-CCNC: 51 U/L (ref 4–36)
ANION GAP SERPL CALCULATED.3IONS-SCNC: 16 MMOL/L (ref 3–16)
AST SERPL-CCNC: 27 U/L (ref 8–33)
BACTERIA URNS QL MICRO: ABNORMAL /HPF
BASOPHILS # BLD: 0.1 K/UL (ref 0–0.1)
BASOPHILS NFR BLD: 0.7 %
BILIRUB SERPL-MCNC: 0.5 MG/DL (ref 0.3–1.2)
BILIRUB UR QL STRIP.AUTO: ABNORMAL
BUN SERPL-MCNC: 19 MG/DL (ref 6–20)
CALCIUM SERPL-MCNC: 9.7 MG/DL (ref 8.3–10.6)
CHLORIDE SERPL-SCNC: 101 MMOL/L (ref 98–107)
CLARITY UR: CLEAR
CO2 SERPL-SCNC: 23 MMOL/L (ref 20–30)
COLOR UR: YELLOW
CREAT SERPL-MCNC: 1 MG/DL (ref 0.9–1.3)
EOSINOPHIL # BLD: 0 K/UL (ref 0–0.4)
EOSINOPHIL NFR BLD: 0.5 %
ERYTHROCYTE [DISTWIDTH] IN BLOOD BY AUTOMATED COUNT: 13 % (ref 11–16)
GFR SERPLBLD CREATININE-BSD FMLA CKD-EPI: >90 ML/MIN/{1.73_M2}
GLOBULIN SER CALC-MCNC: 3.2 G/DL
GLUCOSE SERPL-MCNC: 116 MG/DL (ref 74–106)
GLUCOSE UR STRIP.AUTO-MCNC: NEGATIVE MG/DL
HCT VFR BLD AUTO: 47.6 % (ref 40–54)
HGB BLD-MCNC: 15.8 G/DL (ref 13–18)
HGB UR QL STRIP.AUTO: ABNORMAL
IMM GRANULOCYTES # BLD: 0 K/UL
IMM GRANULOCYTES NFR BLD: 0.2 % (ref 0–5)
KETONES UR STRIP.AUTO-MCNC: ABNORMAL MG/DL
LEUKOCYTE ESTERASE UR QL STRIP.AUTO: ABNORMAL
LYMPHOCYTES # BLD: 3.6 K/UL (ref 1.5–4)
LYMPHOCYTES NFR BLD: 41 %
MAGNESIUM SERPL-MCNC: 2.1 MG/DL (ref 1.7–2.4)
MCH RBC QN AUTO: 29.9 PG (ref 27–32)
MCHC RBC AUTO-ENTMCNC: 33.2 G/DL (ref 31–35)
MCV RBC AUTO: 90.2 FL (ref 80–100)
MONOCYTES # BLD: 0.8 K/UL (ref 0.2–0.8)
MONOCYTES NFR BLD: 9.6 %
MUCOUS THREADS URNS QL MICRO: ABNORMAL /LPF
NEUTROPHILS # BLD: 4.2 K/UL (ref 2–7.5)
NEUTS SEG NFR BLD: 48 %
NITRITE UR QL STRIP.AUTO: NEGATIVE
PH UR STRIP.AUTO: 6 [PH] (ref 5–8)
PLATELET # BLD AUTO: 365 K/UL (ref 150–400)
PMV BLD AUTO: 10.9 FL (ref 6–10)
POTASSIUM SERPL-SCNC: 3.5 MMOL/L (ref 3.4–5.1)
PROT SERPL-MCNC: 7.7 G/DL (ref 6.4–8.3)
PROT UR STRIP.AUTO-MCNC: 30 MG/DL
RBC # BLD AUTO: 5.28 M/UL (ref 4.5–6)
RBC #/AREA URNS HPF: ABNORMAL /HPF (ref 0–4)
SODIUM SERPL-SCNC: 140 MMOL/L (ref 136–145)
SP GR UR STRIP.AUTO: >=1.03 (ref 1–1.03)
UA COMPLETE W REFLEX CULTURE PNL UR: YES
UA DIPSTICK W REFLEX MICRO PNL UR: YES
URN SPEC COLLECT METH UR: ABNORMAL
UROBILINOGEN UR STRIP-ACNC: 0.2 E.U./DL
WBC # BLD AUTO: 8.8 K/UL (ref 4–11)
WBC #/AREA URNS HPF: ABNORMAL /HPF (ref 0–5)

## 2025-08-26 PROCEDURE — 74176 CT ABD & PELVIS W/O CONTRAST: CPT

## 2025-08-26 PROCEDURE — 6370000000 HC RX 637 (ALT 250 FOR IP): Performed by: EMERGENCY MEDICINE

## 2025-08-26 PROCEDURE — 96374 THER/PROPH/DIAG INJ IV PUSH: CPT

## 2025-08-26 PROCEDURE — 83735 ASSAY OF MAGNESIUM: CPT

## 2025-08-26 PROCEDURE — 87086 URINE CULTURE/COLONY COUNT: CPT

## 2025-08-26 PROCEDURE — 2580000003 HC RX 258: Performed by: EMERGENCY MEDICINE

## 2025-08-26 PROCEDURE — 36415 COLL VENOUS BLD VENIPUNCTURE: CPT

## 2025-08-26 PROCEDURE — 81001 URINALYSIS AUTO W/SCOPE: CPT

## 2025-08-26 PROCEDURE — 80053 COMPREHEN METABOLIC PANEL: CPT

## 2025-08-26 PROCEDURE — 6360000002 HC RX W HCPCS: Performed by: EMERGENCY MEDICINE

## 2025-08-26 PROCEDURE — 99284 EMERGENCY DEPT VISIT MOD MDM: CPT

## 2025-08-26 PROCEDURE — 85025 COMPLETE CBC W/AUTO DIFF WBC: CPT

## 2025-08-26 RX ORDER — CEFUROXIME AXETIL 250 MG/1
250 TABLET ORAL ONCE
Status: COMPLETED | OUTPATIENT
Start: 2025-08-26 | End: 2025-08-26

## 2025-08-26 RX ORDER — TAMSULOSIN HYDROCHLORIDE 0.4 MG/1
0.4 CAPSULE ORAL DAILY
Qty: 10 CAPSULE | Refills: 0 | Status: SHIPPED | OUTPATIENT
Start: 2025-08-26

## 2025-08-26 RX ORDER — ONDANSETRON 4 MG/1
4 TABLET, ORALLY DISINTEGRATING ORAL 3 TIMES DAILY PRN
Qty: 21 TABLET | Refills: 0 | Status: SHIPPED | OUTPATIENT
Start: 2025-08-26

## 2025-08-26 RX ORDER — CEFUROXIME AXETIL 250 MG/1
250 TABLET ORAL 2 TIMES DAILY
Qty: 14 TABLET | Refills: 0 | Status: SHIPPED | OUTPATIENT
Start: 2025-08-26 | End: 2025-09-02

## 2025-08-26 RX ORDER — IBUPROFEN 800 MG/1
800 TABLET, FILM COATED ORAL EVERY 8 HOURS PRN
Qty: 15 TABLET | Refills: 0 | Status: SHIPPED | OUTPATIENT
Start: 2025-08-26 | End: 2025-08-31

## 2025-08-26 RX ORDER — KETOROLAC TROMETHAMINE 15 MG/ML
15 INJECTION, SOLUTION INTRAMUSCULAR; INTRAVENOUS ONCE
Status: COMPLETED | OUTPATIENT
Start: 2025-08-26 | End: 2025-08-26

## 2025-08-26 RX ORDER — 0.9 % SODIUM CHLORIDE 0.9 %
1000 INTRAVENOUS SOLUTION INTRAVENOUS ONCE
Status: COMPLETED | OUTPATIENT
Start: 2025-08-26 | End: 2025-08-26

## 2025-08-26 RX ADMIN — KETOROLAC TROMETHAMINE 15 MG: 15 INJECTION, SOLUTION INTRAMUSCULAR; INTRAVENOUS at 14:52

## 2025-08-26 RX ADMIN — SODIUM CHLORIDE 1000 ML: 9 INJECTION, SOLUTION INTRAVENOUS at 14:53

## 2025-08-26 RX ADMIN — CEFUROXIME AXETIL 250 MG: 250 TABLET ORAL at 17:14

## 2025-08-26 ASSESSMENT — ENCOUNTER SYMPTOMS
VOICE CHANGE: 0
TROUBLE SWALLOWING: 0
WHEEZING: 0
SHORTNESS OF BREATH: 0

## 2025-08-26 ASSESSMENT — PAIN DESCRIPTION - ORIENTATION: ORIENTATION: LEFT;LOWER

## 2025-08-26 ASSESSMENT — PAIN DESCRIPTION - DESCRIPTORS: DESCRIPTORS: DULL;SHARP

## 2025-08-26 ASSESSMENT — PAIN - FUNCTIONAL ASSESSMENT
PAIN_FUNCTIONAL_ASSESSMENT: 0-10

## 2025-08-26 ASSESSMENT — PAIN SCALES - GENERAL
PAINLEVEL_OUTOF10: 0
PAINLEVEL_OUTOF10: 10
PAINLEVEL_OUTOF10: 8

## 2025-08-26 ASSESSMENT — LIFESTYLE VARIABLES
HOW MANY STANDARD DRINKS CONTAINING ALCOHOL DO YOU HAVE ON A TYPICAL DAY: PATIENT DOES NOT DRINK
HOW OFTEN DO YOU HAVE A DRINK CONTAINING ALCOHOL: NEVER

## 2025-08-26 ASSESSMENT — PAIN DESCRIPTION - LOCATION: LOCATION: FLANK

## 2025-08-28 LAB — BACTERIA UR CULT: NORMAL
